# Patient Record
Sex: MALE | Race: WHITE | Employment: UNEMPLOYED | URBAN - METROPOLITAN AREA
[De-identification: names, ages, dates, MRNs, and addresses within clinical notes are randomized per-mention and may not be internally consistent; named-entity substitution may affect disease eponyms.]

---

## 2019-07-22 ENCOUNTER — HOSPITAL ENCOUNTER (OUTPATIENT)
Dept: LAB | Age: 59
Discharge: HOME | End: 2019-07-22
Payer: COMMERCIAL

## 2019-07-22 ENCOUNTER — OFFICE VISIT (OUTPATIENT)
Dept: HEMATOLOGY | Age: 59
End: 2019-07-22

## 2019-07-22 VITALS
SYSTOLIC BLOOD PRESSURE: 134 MMHG | HEART RATE: 87 BPM | OXYGEN SATURATION: 98 % | HEIGHT: 69 IN | DIASTOLIC BLOOD PRESSURE: 91 MMHG | WEIGHT: 242.25 LBS | TEMPERATURE: 97.7 F | BODY MASS INDEX: 35.88 KG/M2

## 2019-07-22 DIAGNOSIS — K70.31 ALCOHOLIC CIRRHOSIS OF LIVER WITH ASCITES (HCC): Primary | ICD-10-CM

## 2019-07-22 PROBLEM — B18.2 CHRONIC HEPATITIS C WITHOUT HEPATIC COMA (HCC): Status: ACTIVE | Noted: 2019-07-22

## 2019-07-22 PROBLEM — K74.60 CIRRHOSIS OF LIVER WITH ASCITES (HCC): Status: ACTIVE | Noted: 2019-07-22

## 2019-07-22 PROBLEM — F25.0 SCHIZOAFFECTIVE DISORDER, BIPOLAR TYPE (HCC): Status: ACTIVE | Noted: 2019-07-22

## 2019-07-22 PROBLEM — R18.8 CIRRHOSIS OF LIVER WITH ASCITES (HCC): Status: ACTIVE | Noted: 2019-07-22

## 2019-07-22 PROBLEM — F10.10 ALCOHOL ABUSE: Status: ACTIVE | Noted: 2019-07-22

## 2019-07-22 LAB
ANION GAP SERPL CALC-SCNC: 8 MMOL/L (ref 3–18)
BUN SERPL-MCNC: 10 MG/DL (ref 7–18)
BUN/CREAT SERPL: 10 (ref 12–20)
CALCIUM SERPL-MCNC: 7.9 MG/DL (ref 8.5–10.1)
CHLORIDE SERPL-SCNC: 103 MMOL/L (ref 100–111)
CO2 SERPL-SCNC: 24 MMOL/L (ref 21–32)
CREAT SERPL-MCNC: 1.05 MG/DL (ref 0.6–1.3)
FERRITIN SERPL-MCNC: 51 NG/ML (ref 8–388)
GLUCOSE SERPL-MCNC: 86 MG/DL (ref 74–99)
HBV SURFACE AB SER QL IA: NEGATIVE
HBV SURFACE AB SERPL IA-ACNC: <3.1 MIU/ML
HBV SURFACE AG SER QL: <0.1 INDEX
HBV SURFACE AG SER QL: NEGATIVE
HEP BS AB COMMENT,HBSAC: ABNORMAL
INR PPP: 1.4 (ref 0.8–1.2)
IRON SATN MFR SERPL: 45 %
IRON SERPL-MCNC: 151 UG/DL (ref 50–175)
POTASSIUM SERPL-SCNC: 3.7 MMOL/L (ref 3.5–5.5)
PROTHROMBIN TIME: 16.5 SEC (ref 11.5–15.2)
SODIUM SERPL-SCNC: 135 MMOL/L (ref 136–145)
TIBC SERPL-MCNC: 333 UG/DL (ref 250–450)

## 2019-07-22 PROCEDURE — 87340 HEPATITIS B SURFACE AG IA: CPT

## 2019-07-22 PROCEDURE — 85025 COMPLETE CBC W/AUTO DIFF WBC: CPT

## 2019-07-22 PROCEDURE — 82107 ALPHA-FETOPROTEIN L3: CPT

## 2019-07-22 PROCEDURE — 86708 HEPATITIS A ANTIBODY: CPT

## 2019-07-22 PROCEDURE — 86706 HEP B SURFACE ANTIBODY: CPT

## 2019-07-22 PROCEDURE — 85610 PROTHROMBIN TIME: CPT

## 2019-07-22 PROCEDURE — 80076 HEPATIC FUNCTION PANEL: CPT

## 2019-07-22 PROCEDURE — 80048 BASIC METABOLIC PNL TOTAL CA: CPT

## 2019-07-22 PROCEDURE — 82728 ASSAY OF FERRITIN: CPT

## 2019-07-22 PROCEDURE — 86704 HEP B CORE ANTIBODY TOTAL: CPT

## 2019-07-22 PROCEDURE — 83540 ASSAY OF IRON: CPT

## 2019-07-22 PROCEDURE — 36415 COLL VENOUS BLD VENIPUNCTURE: CPT

## 2019-07-22 RX ORDER — SPIRONOLACTONE 100 MG/1
TABLET, FILM COATED ORAL DAILY
COMMUNITY
End: 2019-08-28 | Stop reason: SDUPTHER

## 2019-07-22 RX ORDER — OXCARBAZEPINE 300 MG/1
TABLET, FILM COATED ORAL
COMMUNITY

## 2019-07-22 RX ORDER — FUROSEMIDE 20 MG/1
TABLET ORAL DAILY
COMMUNITY
End: 2019-08-28 | Stop reason: SDUPTHER

## 2019-07-22 RX ORDER — SERTRALINE HYDROCHLORIDE 100 MG/1
TABLET, FILM COATED ORAL DAILY
COMMUNITY

## 2019-07-22 RX ORDER — RISPERIDONE 3 MG/1
TABLET, FILM COATED ORAL
COMMUNITY

## 2019-07-22 NOTE — PROGRESS NOTES
[address] [facility] MD Rajeev, MD Lex Bates, [place], [facility]-BC     April S Kathie Abrazo Central CampusNP-BC   Santiago Gonzalez P-C Rosi Cogan, [facility]       Lucia Cervantes [facility] 136    at [address], [facility] Sandra Carlson  22.    [phone]    FAX: [address] [place] [address], [address] [phone]    FAX: [phone]       Patient Care Team:  Milagros Farooq NP as PCP - General (Nurse Practitioner)      Problem List  Never Reviewed          Codes Class Noted    Schizoaffective disorder, bipolar type [facility]) ICD-10-CM: F25.0  ICD-9-CM: 295.70  7/22/2019        Cirrhosis of liver with ascites [facility]) ICD-10-CM: K74.60, R18.8  ICD-9-CM: 571.5  7/22/2019        Chronic hepatitis C without hepatic coma ([facility] [address] ICD-10-CM: B18.2  ICD-9-CM: 070.54  7/22/2019        Alcohol abuse ICD-10-CM: F10.10  ICD-9-CM: 305.00  7/22/2019            The clinicians listed above have asked me to see Bertin Hardy in consultation regarding management of   cirrhosis secondary to chronic HCV and alcohol. All medical records sent by the referring physicians were reviewed including imaging studies     The patient is a 63year old  male who was told he had chronic liver disease and cirrhosis in 1990's when he lived in [place]. An assessment of liver fibrosis with biopsy or elastography has not been performed. Serologic evaluation for markers of chronic liver disease was positive for HCV, genotype 1b. Hepatitis C RNA PCR 2,050,000. The patient has developed the following complications of cirrhosis: Ascites, hepatic encephalopathy.     The patient has the following symptoms which are thought to be due to the liver disease: pain in the right side over the liver, yellowing of the eyes or skin, itching, problems concentrating, swelling of the abdomen, swelling of the lower extremities,     The patient has not experienced the following symptoms:  hematemesis, hematochezia. The patient has moderate to severe limitations in functional activities which can be attributed to the liver disease and to other medical problems that are not related to the liver disease. 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 The diagnosis of cirrhosis is based upon laboratory studies and complications of cirrhosis. Liver transaminases are elevated. ALP is elevated. Total bilirubin is elevated. INR is prolonged. Albumin is depressed. The platelet count is depressed. Based upon laboratory studies the patient appears to have cirrhosis. Will perform laboratory testing to monitor liver function and degree of liver injury. This included BMP, hepatic panel, CBC with platelet count, INR. Serologic testing for causes of chronic liver disease were ordered. We will perform laboratory studies in order to calculate the MELD score. The patient is not a candidate for liver transplantation because of poor social support. Chronic HCV   Chronic HCV with cirrhosis. Severity of the liver disease was assessed by laboratory studies  Will perform laboratory testing to monitor liver function and degree of liver injury. This included BMP, hepatic panel, CBC with platelet count, INR. Will perform and/or review results of HCV viral load,   Will perform  serologic and virologic studies to assess for other causes of chronic liver disease. Will perform imaging of the liver with ultrasound. Chronic HCV Treatment  The patient has not been treated for HCV. Discussed the treatment alternatives.   The SVR/cure rate for HCV now exceeds 97% without significant side effects for most patients with HCV. The specific treatment is dependent upon genotype, viral load and histology. The patient should be treated with Epclusa (sofosbuvir and valpitasvir) and ribavirin for 12 weeks. The SVR/cure rate for is about 90%. Screening for Esophageal varices   The patient has not had an EGD to screen for varices. Will schedule for EGD to assess for varices and need for banding. Hepatic encephalopathy   Overt HE has not developed to date. There is no need to restrict dietary protein at this time. Thrombocytopenia   This is secondary to cirrhosis. No treatment is needed at this time. Treatment of other medical problems in patients with chronic liver disease  There are no contraindications for the patient to take most medications that are necessary for treatment of other medical issues. The patient has cirrhrosis and should avoid taking NSAIDs which are associated with a higher rate of developing CORBY. The patient consumes alcohol on a daily basis or has recently stopped consuming alcohol. Regular alcohol use increases the risk of toxicity from acetaminophen. This analgesic should be avoided until the patient has been abstinent from alcohol for 6 months. Counseling for alcohol in patients with chronic liver disease  The patient has cirrhosis and was advised to be abstinent from all alcohol including non-alcoholic beer which does contain some alcohol. Drug use  The patient was counseled regarding the risk of overdose and death from using narcotic drugs and the risk of becoming reinfected with HCV once they are cured if they resume narcotic drug use. The patient is actively using narcotic drugs and was referred to a drug abuse program prior to initiating HCV treatment. Vaccinations   The need for vaccination against viral hepatitis A and B will be assessed with serologic and instituted as appropriate.   Routine vaccinations against other bacterial and viral agents can be performed as indicated. Annual flu vaccination should be administered if indicated. ALLERGIES  Allergies   Allergen Reactions    Penicillins Other (comments)     Unsure of reaction, had when he was a child       MEDICATIONS  Current Outpatient Medications   Medication Sig    sertraline (ZOLOFT) 100 mg tablet Take  by mouth daily.  risperiDONE (RISPERDAL) 3 mg tablet Take  by mouth.  OXcarbazepine (TRILEPTAL) 300 mg tablet Take  by mouth.  furosemide (LASIX) 20 mg tablet Take  by mouth daily.  spironolactone (ALDACTONE) 100 mg tablet Take  by mouth daily.  risperiDONE microspheres (RISPERDAL CONSTA) 50 mg/2 mL injection 50 mg by IntraMUSCular route once. No current facility-administered medications for this visit. SYSTEM REVIEW NOT RELATED TO LIVER DISEASE OR REVIEWED ABOVE:  Constitution systems: Negative for fever, chills, weight gain, weight loss. Eyes: Negative for visual changes. ENT: Negative for sore throat, painful swallowing. Respiratory: Negative for cough, hemoptysis, SOB. Cardiology: Negative for chest pain, palpitations. GI:  Negative for constipation or diarrhea. : Negative for urinary frequency, dysuria, hematuria, nocturia. Skin: Negative for rash. Hematology: Negative for easy bruising, blood clots. Musculo-skelatal: Negative for back pain, muscle pain, weakness. Neurologic: Negative for headaches, dizziness, vertigo, memory problems not related to HE. Psychology: Negative for anxiety, depression. FAMILY HISTORY  The father Has/had the following following chronic disease(s): Prostate cancer, HTN, DM  The patient has no knowledge of the mother's medical condition. There is no family history of liver disease. There is no family history of immune disorders. SOCIAL HISTORY:  The patient is . The patient has 2 children. The patient currently smokes 1/2 pack of tobacco daily.     The patient consumed significant amounts of alcohol since the 1970s. The patient has been abstinent from alcohol since 5/2019. He stopped using street drugs 5 years ago. He stopped using IV drugs in 2009. The patient does not work. The patient is applying for disability. PHYSICAL EXAMINATION:  Visit Vitals  BP (!) 134/91   Pulse 87   Temp 97.7 °F (36.5 °C) (Tympanic)   Ht 5' 8.5\" (1.74 m)   Wt 242 lb 4 oz (109.9 kg)   SpO2 98%   BMI 36.30 kg/m²     General: No acute distress. Eyes: Sclera anicteric. ENT: No oral lesions. Thyroid normal.  Nodes: No adenopathy. Skin: +ve spider angiomata. No jaundice. No palmar erythema. Respiratory: Lungs clear to auscultation. Cardiovascular: Regular heart rate. No murmurs. No JVD. Abdomen: Soft non-tender. Liver size normal to percussion/palpation. Spleen not palpable. No obvious ascites. Extremities: No edema. No muscle wasting. No gross arthritic changes. Neurologic: Alert and oriented. Cranial nerves grossly intact. No asterixis. LABORATORY STUDIES:  Liver [place] of [address] [place] [address] & Units 8/19/2019 7/22/2019   WBC 4.6 - 13.2 K/uL 2.9 (L) 2.5 (L)   ANC 1.8 - 8.0 K/UL 1.6 (L) 1.2 (L)   HGB 13.0 - 16.0 g/dL 12.6 (L) 11.4 (L)    - 420 K/uL 55 (L) 52 (L)   INR 0.8 - 1.2   1.3 (H) 1.4 (H)   AST 10 - 38 U/L 83 (H) 87 (H)   ALT 16 - 61 U/L 50 45   Alk Phos 45 - 117 U/L 209 (H) 200 (H)   Bili, Total 0.2 - 1.0 MG/DL 1.6 (H) 1.6 (H)   Bili, Direct 0.0 - 0.2 MG/DL 1.0 (H) 1.0 (H)   Albumin 3.4 - 5.0 g/dL 2.6 (L) 2.4 (L)   BUN 7.0 - 18 MG/DL 14 10   Creat 0.6 - 1.3 MG/DL 1.08 1.05   Na 136 - 145 mmol/L 138 135 (L)   K 3.5 - 5.5 mmol/L 3.5 3.7   Cl 100 - 111 mmol/L 106 103   CO2 21 - 32 mmol/L 25 24   Glucose 74 - 99 mg/dL 92 86   Ammonia 11 - 32 UMOL/L 69 (H)      Cancer Screening Latest Ref Rng & Units 8/19/2019 7/22/2019   AFP, Serum 0.0 - 8.0 ng/mL 41.5 (H) 28.1 (H)   AFP-L3% 0.0 - 9.9 % 14.1 (H) 15.1 (H)     SEROLOGIES:  1/2019.   anti-HCV positive, Genotype 1b  2/2019. HCV RNA 2.1 million intU    Serologies Latest Ref Rng & Units 7/22/2019   Hep A Ab, Total NEGATIVE   NEGATIVE   Hep B Surface Ag <1.00 Index <0.10   Hep B Surface Ag Interp NEG   NEGATIVE   Hep B Core Ab, Total NEGATIVE   NEGATIVE   Hep B Surface Ab >10.0 mIU/mL <3.10 (L)   Hep B Surface Ab Interp POS   NEGATIVE (A)   Ferritin 8 - 388 NG/ML 51   Iron % Saturation % 45     LIVER HISTOLOGY:  8/2019. Sheer wave elastography performed at THE [facility]. E Range: 7.74-12.42 kPa, E Mean: 8.63 kPa, E Median: 7.91 kPa, E Std: 2.0 kPa. The results suggested a fibrosis level of F2.    ENDOSCOPIC PROCEDURES:  Not available or performed    RADIOLOGY:  8/2019. Ultrasound of liver. Echogenic consistent with cirrhosis. No liver mass lesions. No dilated bile ducts. No ascites. OTHER TESTING:  Not available or performed    FOLLOW-UP:  All of the issues listed above in the Assessment and Plan were discussed with the patient. All questions were answered. The patient expressed a clear understanding of the above. [address] [address] in  4 weeks for elastography and to review all data and determine the treatment plan. Myah Florence MD  [place] [address].  [phone]  Cierra Kc MD  I have personally interviewed and examined the patient during the encounter. I have explained the key findings related to the liver disease and other pertinent diagnoses as noted above to the patient and answered all questions. I have reviewed and agree with the findings documented above, including all diagnostic interpretations, and plans. I have edited the original note as appropriate for clarity.     Ofelia Christiansen MD  [address]  4 [facility], [address] [place] [address], 300 [facility] [address]  12 [facility]

## 2019-07-22 NOTE — Clinical Note
8/25/19 Patient: Merlyn Qureshi YOB: 1960 Date of Visit: 7/22/2019 Spencer Mederos NP 
[address] [facility] 40 [facility] [address] VIA Facsimile: [phone] Dear Spencer Mederos NP, Thank you for referring Mr. Merlyn Qureshi to [facility] for evaluation. My notes for this consultation are attached. If you have questions, please do not hesitate to call me. I look forward to following your patient along with you. Sincerely, Zayra Turk MD

## 2019-08-13 ENCOUNTER — HOSPITAL ENCOUNTER (OUTPATIENT)
Dept: ULTRASOUND IMAGING | Age: 59
Discharge: HOME | End: 2019-08-13
Payer: COMMERCIAL

## 2019-08-13 DIAGNOSIS — K70.31 ALCOHOLIC CIRRHOSIS OF LIVER WITH ASCITES (HCC): ICD-10-CM

## 2019-08-13 PROCEDURE — 76981 USE PARENCHYMA: CPT

## 2019-08-18 LAB
AFP L3 MFR SERPL: 15.1 % (ref 0–9.9)
AFP SERPL-MCNC: 28.1 NG/ML (ref 0–8)
ALBUMIN SERPL-MCNC: 2.4 G/DL (ref 3.4–5)
ALBUMIN/GLOB SERPL: 0.5 {RATIO} (ref 0.8–1.7)
ALP SERPL-CCNC: 200 U/L (ref 45–117)
ALT SERPL-CCNC: 45 U/L (ref 16–61)
AST SERPL-CCNC: 87 U/L (ref 10–38)
BASOPHILS # BLD: 0 K/UL (ref 0–0.1)
BASOPHILS NFR BLD: 0 % (ref 0–2)
BILIRUB DIRECT SERPL-MCNC: 1 MG/DL (ref 0–0.2)
BILIRUB SERPL-MCNC: 1.6 MG/DL (ref 0.2–1)
DIFFERENTIAL METHOD BLD: ABNORMAL
EOSINOPHIL # BLD: 0 K/UL (ref 0–0.4)
EOSINOPHIL NFR BLD: 2 % (ref 0–5)
ERYTHROCYTE [DISTWIDTH] IN BLOOD BY AUTOMATED COUNT: 15.8 % (ref 11.6–14.5)
GLOBULIN SER CALC-MCNC: 4.8 G/DL (ref 2–4)
HAV AB SER QL IA: NEGATIVE
HBV CORE AB SERPL QL IA: NEGATIVE
HCT VFR BLD AUTO: 32.4 % (ref 36–48)
HGB BLD-MCNC: 11.4 G/DL (ref 13–16)
LYMPHOCYTES # BLD: 0.9 K/UL (ref 0.9–3.6)
LYMPHOCYTES NFR BLD: 34 % (ref 21–52)
MCH RBC QN AUTO: 35.1 PG (ref 24–34)
MCHC RBC AUTO-ENTMCNC: 35.2 G/DL (ref 31–37)
MCV RBC AUTO: 99.7 FL (ref 74–97)
MONOCYTES # BLD: 0.4 K/UL (ref 0.05–1.2)
MONOCYTES NFR BLD: 15 % (ref 3–10)
NEUTS SEG # BLD: 1.2 K/UL (ref 1.8–8)
NEUTS SEG NFR BLD: 49 % (ref 40–73)
PLATELET # BLD AUTO: 52 K/UL (ref 135–420)
PMV BLD AUTO: 9.8 FL (ref 9.2–11.8)
PROT SERPL-MCNC: 7.2 G/DL (ref 6.4–8.2)
RBC # BLD AUTO: 3.25 M/UL (ref 4.7–5.5)
WBC # BLD AUTO: 2.5 K/UL (ref 4.6–13.2)

## 2019-08-19 ENCOUNTER — OFFICE VISIT (OUTPATIENT)
Dept: HEMATOLOGY | Age: 59
End: 2019-08-19

## 2019-08-19 ENCOUNTER — HOSPITAL ENCOUNTER (OUTPATIENT)
Dept: LAB | Age: 59
Discharge: HOME | End: 2019-08-19
Payer: COMMERCIAL

## 2019-08-19 VITALS
RESPIRATION RATE: 16 BRPM | OXYGEN SATURATION: 94 % | SYSTOLIC BLOOD PRESSURE: 114 MMHG | BODY MASS INDEX: 35.55 KG/M2 | WEIGHT: 240 LBS | HEIGHT: 69 IN | DIASTOLIC BLOOD PRESSURE: 86 MMHG | TEMPERATURE: 97.9 F | HEART RATE: 88 BPM

## 2019-08-19 DIAGNOSIS — K70.31 ALCOHOLIC CIRRHOSIS OF LIVER WITH ASCITES (HCC): Primary | ICD-10-CM

## 2019-08-19 DIAGNOSIS — K70.31 ALCOHOLIC CIRRHOSIS OF LIVER WITH ASCITES (HCC): ICD-10-CM

## 2019-08-19 DIAGNOSIS — E66.01 SEVERE OBESITY (HCC): ICD-10-CM

## 2019-08-19 LAB
ALBUMIN SERPL-MCNC: 2.6 G/DL (ref 3.4–5)
ALBUMIN/GLOB SERPL: 0.5 {RATIO} (ref 0.8–1.7)
ALP SERPL-CCNC: 209 U/L (ref 45–117)
ALT SERPL-CCNC: 50 U/L (ref 16–61)
AMMONIA PLAS-SCNC: 69 UMOL/L (ref 11–32)
ANION GAP SERPL CALC-SCNC: 7 MMOL/L (ref 3–18)
AST SERPL-CCNC: 83 U/L (ref 10–38)
BASOPHILS # BLD: 0 K/UL (ref 0–0.1)
BASOPHILS NFR BLD: 1 % (ref 0–2)
BILIRUB DIRECT SERPL-MCNC: 1 MG/DL (ref 0–0.2)
BILIRUB SERPL-MCNC: 1.6 MG/DL (ref 0.2–1)
BUN SERPL-MCNC: 14 MG/DL (ref 7–18)
BUN/CREAT SERPL: 13 (ref 12–20)
CALCIUM SERPL-MCNC: 8.1 MG/DL (ref 8.5–10.1)
CHLORIDE SERPL-SCNC: 106 MMOL/L (ref 100–111)
CO2 SERPL-SCNC: 25 MMOL/L (ref 21–32)
CREAT SERPL-MCNC: 1.08 MG/DL (ref 0.6–1.3)
DIFFERENTIAL METHOD BLD: ABNORMAL
EOSINOPHIL # BLD: 0.1 K/UL (ref 0–0.4)
EOSINOPHIL NFR BLD: 2 % (ref 0–5)
ERYTHROCYTE [DISTWIDTH] IN BLOOD BY AUTOMATED COUNT: 15.7 % (ref 11.6–14.5)
ETHANOL SERPL-MCNC: <3 MG/DL (ref 0–3)
GLOBULIN SER CALC-MCNC: 5 G/DL (ref 2–4)
GLUCOSE SERPL-MCNC: 92 MG/DL (ref 74–99)
HCT VFR BLD AUTO: 35.4 % (ref 36–48)
HGB BLD-MCNC: 12.6 G/DL (ref 13–16)
INR PPP: 1.3 (ref 0.8–1.2)
LYMPHOCYTES # BLD: 0.9 K/UL (ref 0.9–3.6)
LYMPHOCYTES NFR BLD: 32 % (ref 21–52)
MCH RBC QN AUTO: 36.3 PG (ref 24–34)
MCHC RBC AUTO-ENTMCNC: 35.6 G/DL (ref 31–37)
MCV RBC AUTO: 102 FL (ref 74–97)
MONOCYTES # BLD: 0.3 K/UL (ref 0.05–1.2)
MONOCYTES NFR BLD: 11 % (ref 3–10)
NEUTS SEG # BLD: 1.6 K/UL (ref 1.8–8)
NEUTS SEG NFR BLD: 54 % (ref 40–73)
PLATELET # BLD AUTO: 55 K/UL (ref 135–420)
PMV BLD AUTO: 10.3 FL (ref 9.2–11.8)
POTASSIUM SERPL-SCNC: 3.5 MMOL/L (ref 3.5–5.5)
PROT SERPL-MCNC: 7.6 G/DL (ref 6.4–8.2)
PROTHROMBIN TIME: 15.9 SEC (ref 11.5–15.2)
RBC # BLD AUTO: 3.47 M/UL (ref 4.7–5.5)
SODIUM SERPL-SCNC: 138 MMOL/L (ref 136–145)
WBC # BLD AUTO: 2.9 K/UL (ref 4.6–13.2)

## 2019-08-19 PROCEDURE — 85610 PROTHROMBIN TIME: CPT

## 2019-08-19 PROCEDURE — 85025 COMPLETE CBC W/AUTO DIFF WBC: CPT

## 2019-08-19 PROCEDURE — 36415 COLL VENOUS BLD VENIPUNCTURE: CPT

## 2019-08-19 PROCEDURE — 80048 BASIC METABOLIC PNL TOTAL CA: CPT

## 2019-08-19 PROCEDURE — 80076 HEPATIC FUNCTION PANEL: CPT

## 2019-08-19 PROCEDURE — 80307 DRUG TEST PRSMV CHEM ANLYZR: CPT

## 2019-08-19 PROCEDURE — 82140 ASSAY OF AMMONIA: CPT

## 2019-08-19 PROCEDURE — 82107 ALPHA-FETOPROTEIN L3: CPT

## 2019-08-19 NOTE — PROGRESS NOTES
[facility], MD, MD Moreno Elliott, [place], [facility]-BC     Tamie Vázquez, [facility]   JUAN A BirminghamP-AVA [facility]       [facility] 136    at [address], [address] [place] [address], [facility] 22.    [phone]    FAX: [address] [place] [address], [address]    [phone]    FAX: [phone]       Patient Care Team:  Oma Kawasaki, NP as PCP - General (Nurse Practitioner)      Problem List  Date Reviewed: 8/19/2019          Codes Class Noted    Severe obesity ([facility].) ICD-10-CM: E66.01  ICD-9-CM: 278.01  8/19/2019        Schizoaffective disorder, bipolar type ([facility].) ICD-10-CM: F25.0  ICD-9-CM: 295.70  7/22/2019        Cirrhosis of liver with ascites [facility]) ICD-10-CM: K74.60, R18.8  ICD-9-CM: 571.5  7/22/2019        Chronic hepatitis C without hepatic coma ([facility].) ICD-10-CM: B18.2  ICD-9-CM: 070.54  7/22/2019        Alcohol abuse ICD-10-CM: F10.10  ICD-9-CM: 305.00  7/22/2019              Jimmy Ramires returns to the [place] [address] today for education and management of cirrhosis secondary to HCV and alcohol. The active problem list, all pertinent past medical history, medications, liver histology, endoscopic studies, radiologic findings and laboratory findings related to the liver disorder were reviewed with the patient. The patient is a 63year old  male who was found to have chronic liver disease and cirrhosis in 1990's. The patient was found to have cirrhosis when he used to live in [facility] [place]. An assessment of liver fibrosis with shear wave elastography was performed in 08/2019 and suggests a Metavir fibrosis score of F1.   However the wide standard deviation and broad range brings the mean and medium into question. The patient clearly has cirrhosis. Serologic evaluation for markers of chronic liver disease was positive for HCV, genotype 1b. Hepatitis C RNA PCR 2,050,000. The patient has developed the following complications of cirrhosis: Ascites, hepatic encephalopathy. The patient has the following symptoms which are thought to be due to the liver disease: pain in the right side over the liver, yellowing of the eyes or skin, itching, problems concentrating, swelling of the abdomen, swelling of the lower extremities,     The patient has not experienced the following symptoms:  hematemesis, hematochezia. The patient has moderate to severe limitations in functional activities which can be attributed to the liver disease. and to other medical problems that are not related to the liver disease. Since the last office appointment the patient has:  Had no changes in the liver disease. 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 The diagnosis of cirrhosis is based upon laboratory studies, imaging, and complications of cirrhosis. The most recent laboratory studies indicate that the liver transaminases are elevated, alkaline phosphatase is elevated, tests of hepatic synthetic and metabolic function are depressed, and the platelet count is depressed. Will perform laboratory testing to monitor liver function and degree of liver injury. This will include hepatic panel, a CBC w/ diff, a BMP, a PT/INR, an ammonia level, and alcohol screen, and an AFP-L3%. Based upon laboratory studies the patient clearly has cirrhosis. We will perform laboratory studies in order to calculate the [facility] MELD score. CPT score is 8, Child's Yee Class is B, MELD score is 12. The patient is not a candidate for liver transplantation because of poor social support. Chronic HCV   Chronic HCV with cirrhosis. Severity of the liver disease was assessed by laboratory studies  Will perform laboratory testing to monitor liver function and degree of liver injury. This included BMP, hepatic panel, CBC with platelet count, INR. Serologic evaluation was negative for all causes of chronic liver disease. Chronic HCV Treatment  The patient has genotype 1B and is treatment naive. Discussed the treatment alternatives. The SVR/cure rate for HCV now exceeds 97% without significant side effects for most patients with HCV. The specific treatment is dependent upon genotype, viral load and histology. The patient should be treated for 12 weeks. Unfortunately, all current HCV treatment modalities are contraindicated with Trilpetal.   Neither the patient or his caregiver know why he is on the Trilpetal.  He denies ever having any seizure activity. I will reach out to Dr. Chase Dickson and ask if he can change the medication to something that is okay to use with the current antiviral.  [address] is one anti-seizure medication that is not contraindicated with current antivirals. Screening for Esophageal varices   The patient has not had an EGD to screen for varices. The patient is scheduled for EGD. Hepatic encephalopathy   Overt HE has not developed to date. There is no need to restrict dietary protein at this time. Thrombocytopenia   This is secondary to cirrhosis. No treatment is needed at this time. Treatment of other medical problems in patients with chronic liver disease  There are no contraindications for the patient to take most medications that are necessary for treatment of other medical issues. The patient has cirrhrosis and should avoid taking Benzodiazapines which could exacerbate HE.  NSAIDs which are associated with a higher rate of developing CORBY.       The patient can take any medications utilized for treatment of DM or statins to treat hypercholesterolemia  The patient has recently stopped consuming alcohol. Regular alcohol use increases the risk of toxicity from acetaminophen. This analgesic should be avoided until the patient has been abstinent from alcohol for 6 months. Counseling for alcohol in patients with chronic liver disease  The patient was counseled regarding alcohol consumption and the effect of alcohol on chronic liver disease. The patient has cirrhosis and was advised to be abstinent from all alcohol including non-alcoholic beer which does contain some alcohol. Osteoporosis  The risk of osteoporosis is increased in patients with cirrhosis. DEXA bone density to assess for osteoporosis      This should be ordered by the patients primary care physician. Vaccinations   Vaccination for viral hepatitis A and B is recommended. The patient does not have serologic evidence of prior exposure or vaccination with immunity. Routine vaccinations against other bacterial and viral agents can be performed as indicated. Annual flu vaccination should be administered if indicated. ALLERGIES  Allergies   Allergen Reactions    Penicillins Other (comments)     Unsure of reaction, had when he was a child       MEDICATIONS  Current Outpatient Medications   Medication Sig    sertraline (ZOLOFT) 100 mg tablet Take  by mouth daily.  risperiDONE (RISPERDAL) 3 mg tablet Take  by mouth.  OXcarbazepine (TRILEPTAL) 300 mg tablet Take  by mouth.  furosemide (LASIX) 20 mg tablet Take  by mouth daily.  spironolactone (ALDACTONE) 100 mg tablet Take  by mouth daily.  risperiDONE microspheres (RISPERDAL CONSTA) 50 mg/2 mL injection 50 mg by IntraMUSCular route once. No current facility-administered medications for this visit. SYSTEM REVIEW NOT RELATED TO LIVER DISEASE OR REVIEWED ABOVE:  Constitution systems: Negative for fever, chills, weight gain, weight loss. Eyes: Negative for visual changes. ENT: Negative for sore throat, painful swallowing.    Respiratory: Negative for cough, hemoptysis, SOB. Cardiology: Negative for chest pain, palpitations. GI:  Negative for constipation or diarrhea. : Negative for urinary frequency, dysuria, hematuria, nocturia. Skin: Negative for rash. Hematology: Negative for easy bruising, blood clots. Musculo-skelatal: Negative for back pain, muscle pain, weakness. Neurologic: Negative for headaches, dizziness, vertigo, memory problems not related to HE. Psychology: Negative for anxiety, depression. FAMILY HISTORY  The father Has/had the following following chronic disease(s): Prostate cancer, HTN, DM  The patient has no knowledge of the mother's medical condition. There is no family history of liver disease. There is no family history of immune disorders. SOCIAL HISTORY:  The patient is . The patient has 2 children. The patient currently smokes 1/2 pack of tobacco daily. The patient used to consume significant amount of alcohol- liquor, beer for over 46 years. The patient has been abstinent from alcohol since May 2019. Stopped using street drugs 5 years ago. Stopped IV drug use 10 years ago. The patient does not work. The patient is applying for disability. PHYSICAL EXAMINATION:  Visit Vitals  /86 (BP 1 Location: Right arm, BP Patient Position: Sitting)   Pulse 88   Temp 97.9 °F (36.6 °C) (Tympanic)   Resp 16   Ht 5' 8.5\" (1.74 m)   Wt 240 lb (108.9 kg)   SpO2 94%   BMI 35.96 kg/m²     General: No acute distress. Eyes: Sclera anicteric. ENT: No oral lesions. Thyroid normal.  Nodes: No adenopathy. Skin: +ve spider angiomata. No jaundice. No palmar erythema. Respiratory: Lungs clear to auscultation. Cardiovascular: Regular heart rate. No murmurs. No JVD. Abdomen: Soft non-tender. Liver size normal to percussion/palpation. Spleen not palpable. No obvious ascites. Extremities: No edema. No muscle wasting. No gross arthritic changes.   Neurologic: Alert and oriented. Cranial nerves grossly intact. No asterixis. LABORATORY STUDIES:  Liver [place] of [id number] & Units 8/19/2019 7/22/2019   WBC 4.6 - 13.2 K/uL 2.9 (L) 2.5 (L)   ANC 1.8 - 8.0 K/UL 1.6 (L) 1.2 (L)   HGB 13.0 - 16.0 g/dL 12.6 (L) 11.4 (L)    - 420 K/uL 55 (L) 52 (L)   INR 0.8 - 1.2   1.3 (H) 1.4 (H)   AST 10 - 38 U/L 83 (H) 87 (H)   ALT 16 - 61 U/L 50 45   Alk Phos 45 - 117 U/L 209 (H) 200 (H)   Bili, Total 0.2 - 1.0 MG/DL 1.6 (H) 1.6 (H)   Bili, Direct 0.0 - 0.2 MG/DL 1.0 (H) 1.0 (H)   Albumin 3.4 - 5.0 g/dL 2.6 (L) 2.4 (L)   BUN 7.0 - 18 MG/DL 14 10   Creat 0.6 - 1.3 MG/DL 1.08 1.05   Na 136 - 145 mmol/L 138 135 (L)   K 3.5 - 5.5 mmol/L 3.5 3.7   Cl 100 - 111 mmol/L 106 103   CO2 21 - 32 mmol/L 25 24   Glucose 74 - 99 mg/dL 92 86   Ammonia 11 - 32 UMOL/L 69 (H)      From 1/2019  AST/ALT/ALP/T Bili/ALB:217/82/129/2.6/2.9  WBC/HB/PLT/INR:2.4/12.4/55/1.48  BUN/CREAT:11/0.97    SEROLOGIES:  Serologies Latest Ref Rng & Units 7/22/2019   Hep A Ab, Total NEGATIVE   NEGATIVE   Hep B Surface Ag <1.00 Index <0.10   Hep B Surface Ag Interp NEG   NEGATIVE   Hep B Core Ab, Total NEGATIVE   NEGATIVE   Hep B Surface Ab >10.0 mIU/mL <3.10 (L)   Hep B Surface Ab Interp POS   NEGATIVE (A)   Ferritin 8 - 388 NG/ML 51   Iron % Saturation % 45     1/2019  anti-HCV positive,   Genotype 1b    LIVER HISTOLOGY:  08/2019. TRANSIENT HEPATIC ELASTOGRAPHY:   E Range: 7.74-12.42 kPa  E Mean: 8.63 kPa  E Median: 7.91 kPa  E Std: 2.0 kPa    ENDOSCOPIC PROCEDURES:  Not available or performed    RADIOLOGY:  08/2019. Ultrasound of the liver. Coarsened echotexture with lobular surface contour demonstrated. No focal mass. OTHER TESTING:  Not available or performed    FOLLOW-UP:  All of the issues listed above in the Assessment and Plan were discussed with the patient. All questions were answered. The patient expressed a clear understanding of the above.     [address] in 12 weeks to review all data and determine the treatment plan.     ZEB Bhatti-AVA  Liver [place] [address], [address] [place] [address], [address]   [phone]

## 2019-08-19 NOTE — PROGRESS NOTES
Aly Dickson is a 63 y.o. male      1. Have you been to the ER, urgent care clinic or hospitalized since your last visit? NO.     2. Have you seen or consulted any other health care providers outside of the [address] [place] [address] since your last visit (Include any pap smears or colon screening)?  NO          Learning Assessment 8/19/2019   PRIMARY LEARNER Patient   BARRIERS PRIMARY LEARNER HEARING   CO-LEARNER CAREGIVER No   PRIMARY LANGUAGE ENGLISH   LEARNER PREFERENCE PRIMARY LISTENING   ANSWERED BY PATIENT   RELATIONSHIP SELF

## 2019-08-21 LAB
AFP L3 MFR SERPL: 14.1 % (ref 0–9.9)
AFP SERPL-MCNC: 41.5 NG/ML (ref 0–8)

## 2019-08-29 RX ORDER — SPIRONOLACTONE 100 MG/1
100 TABLET, FILM COATED ORAL DAILY
Qty: 90 TAB | Refills: 3 | Status: SHIPPED | OUTPATIENT
Start: 2019-08-29

## 2019-08-29 RX ORDER — FUROSEMIDE 20 MG/1
20 TABLET ORAL DAILY
Qty: 90 TAB | Refills: 3 | Status: SHIPPED | OUTPATIENT
Start: 2019-08-29

## 2019-09-04 RX ORDER — LACTULOSE 10 G/15ML
20 SOLUTION ORAL; RECTAL 2 TIMES DAILY
Qty: 946 ML | Refills: 11 | Status: SHIPPED | OUTPATIENT
Start: 2019-09-04

## 2019-09-11 ENCOUNTER — HOSPITAL ENCOUNTER (OUTPATIENT)
Age: 59
Setting detail: OUTPATIENT SURGERY
Discharge: HOME | End: 2019-09-11
Attending: INTERNAL MEDICINE | Admitting: INTERNAL MEDICINE
Payer: COMMERCIAL

## 2019-09-11 VITALS
WEIGHT: 242.7 LBS | RESPIRATION RATE: 16 BRPM | OXYGEN SATURATION: 94 % | HEART RATE: 80 BPM | HEIGHT: 69 IN | BODY MASS INDEX: 35.95 KG/M2 | TEMPERATURE: 96.8 F | DIASTOLIC BLOOD PRESSURE: 79 MMHG | SYSTOLIC BLOOD PRESSURE: 116 MMHG

## 2019-09-11 PROCEDURE — 74011250636 HC RX REV CODE- 250/636

## 2019-09-11 PROCEDURE — 74011250636 HC RX REV CODE- 250/636: Performed by: INTERNAL MEDICINE

## 2019-09-11 PROCEDURE — 76040000019: Performed by: INTERNAL MEDICINE

## 2019-09-11 RX ORDER — SODIUM CHLORIDE 0.9 % (FLUSH) 0.9 %
5-40 SYRINGE (ML) INJECTION EVERY 8 HOURS
Status: CANCELLED | OUTPATIENT
Start: 2019-09-11

## 2019-09-11 RX ORDER — FLUMAZENIL 0.1 MG/ML
0.2 INJECTION INTRAVENOUS
Status: DISCONTINUED | OUTPATIENT
Start: 2019-09-11 | End: 2019-09-11 | Stop reason: HOSPADM

## 2019-09-11 RX ORDER — SODIUM CHLORIDE 9 MG/ML
150 INJECTION, SOLUTION INTRAVENOUS CONTINUOUS
Status: DISCONTINUED | OUTPATIENT
Start: 2019-09-11 | End: 2019-09-11 | Stop reason: HOSPADM

## 2019-09-11 RX ORDER — ATROPINE SULFATE 0.1 MG/ML
0.5 INJECTION INTRAVENOUS
Status: CANCELLED | OUTPATIENT
Start: 2019-09-11 | End: 2019-09-12

## 2019-09-11 RX ORDER — NALOXONE HYDROCHLORIDE 0.4 MG/ML
0.4 INJECTION, SOLUTION INTRAMUSCULAR; INTRAVENOUS; SUBCUTANEOUS
Status: DISCONTINUED | OUTPATIENT
Start: 2019-09-11 | End: 2019-09-11 | Stop reason: HOSPADM

## 2019-09-11 RX ORDER — EPINEPHRINE 0.1 MG/ML
1 INJECTION INTRACARDIAC; INTRAVENOUS
Status: CANCELLED | OUTPATIENT
Start: 2019-09-11 | End: 2019-09-12

## 2019-09-11 RX ORDER — DEXTROMETHORPHAN/PSEUDOEPHED 2.5-7.5/.8
1.2 DROPS ORAL
Status: CANCELLED | OUTPATIENT
Start: 2019-09-11

## 2019-09-11 RX ORDER — SODIUM CHLORIDE 0.9 % (FLUSH) 0.9 %
5-40 SYRINGE (ML) INJECTION AS NEEDED
Status: CANCELLED | OUTPATIENT
Start: 2019-09-11

## 2019-09-11 RX ORDER — FENTANYL CITRATE 50 UG/ML
100 INJECTION, SOLUTION INTRAMUSCULAR; INTRAVENOUS
Status: DISCONTINUED | OUTPATIENT
Start: 2019-09-11 | End: 2019-09-11 | Stop reason: HOSPADM

## 2019-09-11 RX ORDER — SODIUM CHLORIDE 9 MG/ML
100 INJECTION, SOLUTION INTRAVENOUS CONTINUOUS
Status: CANCELLED | OUTPATIENT
Start: 2019-09-11 | End: 2019-09-11

## 2019-09-11 RX ORDER — MIDAZOLAM HYDROCHLORIDE 1 MG/ML
.25-5 INJECTION, SOLUTION INTRAMUSCULAR; INTRAVENOUS
Status: DISCONTINUED | OUTPATIENT
Start: 2019-09-11 | End: 2019-09-11 | Stop reason: HOSPADM

## 2019-09-11 RX ADMIN — SODIUM CHLORIDE 150 ML/HR: 900 INJECTION, SOLUTION INTRAVENOUS at 07:42

## 2019-09-11 NOTE — PROCEDURES
[facility]40 [facility] [place], Cite Mongi Slim, Red Jakob, MD Anson Batch, [place] Guerrero, [facility]     Tamie Vázquez, [facility]   SCOTT [place], [facility]       Lucia Cervantes [facility] 136    at [address], [facility] Sandra Carlson  22.    [phone]    FAX: [address] [place] [address], [address] [place] [address] - [address]    [phone]    FAX: [phone]         UPPER ENDOSCOPY PROCEDURE NOTE    Maykelsi Tanikarowdy  1960    INDICATION: Cirrhosis. Screening for esophageal varices with variceal ligation if  indicated. : Mahsa Gilliland MD    ANESTHESIA/SEDATION: Versed 5 mg and Fentanyl 100 mcg      PROCEDURE DESCRIPTION:  [employer] consent was obtained from the patient for the procedure. All risks and benefits of the procedure explained. The patient was taken to the endoscopy suite and placed in the left lateral decubitus position. Following sequential administration of sedation to doses as indicated above the endoscope was inserted into the mouth and advanced under direct vision to the second portion of the duodenum. Careful inspection of upper gastrointestinal tract was made as the endoscope was inserted and withdrawn. Retroflexion of the endoscope to view of the cardia of the stomach was performed. The findings and interventions are described below. FINDINGS:  Esophagus:    Normal.      Stomach: Moderate portal hypertensive gastropathy of the body of the stomach  No gastric varices identified. Duodenum:   Normal bulb and second portion    INTERVENTION:   None    COMPLICATIONS: None. The patient tolerated the procedure well. EBL: Negligible.            RECOMMENDATIONS:  Observe until discharge parameters are achieved. May resume previous diet. Repeat endoscopy to reassess varices and need for banding in 3 years. Follow-up [facility] office as scheduled.       Kirsten Barnes MD  [address]  4 [facility], [address] [place] [address] Xiomy Gage, [address]  12 [facility]

## 2019-09-11 NOTE — DISCHARGE INSTRUCTIONS
3340 [facility], MD, [address], Cite Tsering Alvarado, MD Lucretia Nagy PA-C Evonnie Bowie, [facility]     Tamie RYAN Kathie, [facility]   Madan ReesSCOTT, [facility]       Luciamatthias Cervantes Sures[facility] De Nixon 136    at [address], [address] Sandra Carlson  22.    [phone]    FAX: [address] [place] [address], [address]    [phone]    FAX: Horacio Scott 476  1960  Date: 9/11/2019    DISCOMFORT:  Use lozenges or warm salt water gargle for sore thoat  Apply warm compress to IV site if red. If redness or soreness persists call the office. You may experience gas and bloating. Walking and belching will help relieve this. You may experience chest discomfort or pressure especially if banding of esophageal varices was performed. DIET:  You may resume your normal diet. ACTIVITY:  Spend the remainder of the day resting. Avoid any strenuous activity. You may not operate a vehicle for 12 hours. You may not engage in an occupation involving machinery or appliances for rest of today. Avoid making any critical or financial decisions for at least 24 hour. [facility] the [phone] if you have any of the following:  Increasing chest or abdominal pain, nausea, vomiting, vomiting blood, abdominal distension or swelling, fever or chills, bloody discharge from nose or mouth or shortness of breath. Follow-up Instructions:  Call Dr. Paddy Caro for any questions or problems at the phone number listed above. If a biopsy was performed, you will be contacted by the office staff or Dr Paddy Caro within 1 week.    If you have not heard from us by then you may call the office at the phone number listed above to inquire about the results. ENDOSCOPY FINDINGS:  Your endoscopy demonstrated mild swelling of the stoamch. DISCHARGE SUMMARY from the Nurse: The following personal items collected during your admission are returned to you:   Dental Appliance: Dental Appliances: None  Vision: Visual Aid: Glasses(reading)  Hearing Aid:    Jewelry:    Clothing:    Other Valuables:    Valuables sent to safe:          DISCHARGE SUMMARY from Nurse    The following personal items collected during your admission are returned to you:   Dental Appliance: Dental Appliances: None  Vision: Visual Aid: Glasses(reading)  Hearing Aid:    Jewelry:    Clothing:    Other Valuables:    Valuables sent to safe:      As abpve        PATIENT INSTRUCTIONS:    After general anesthesia or intravenous sedation, for 24 hours or while taking prescription Narcotics:  · Limit your activities  · Do not drive and operate hazardous machinery  · Do not make important personal or business decisions  · Do  not drink alcoholic beverages  · If you have not urinated within 8 hours after discharge, please contact your surgeon on call. Report the following to your surgeon:  · Excessive pain, swelling, redness or odor of or around the surgical area  · Temperature over 100.5  · Nausea and vomiting lasting longer than 4 hours or if unable to take medications  · Any signs of decreased circulation or nerve impairment to extremity: change in color, persistent  numbness, tingling, coldness or increase pain  · Any questions      Follow-up Appointments   Procedures    FOLLOW UP VISIT Appointment in: Other (Specify) AS scheduled     AS scheduled     Standing Status:   Standing     Number of Occurrences:   1     Order Specific Question:   Appointment in     Answer:    Other (Specify)       What to do at Home:  Recommended activity: as above,     If you experience any of the following symptoms as above, please follow up with Dr. Yamil León. *  Please give a list of your current medications to your Primary Care Provider. *  Please update this list whenever your medications are discontinued, doses are      changed, or new medications (including over-the-counter products) are added. *  Please carry medication information at all times in case of emergency situations. These are general instructions for a healthy lifestyle:    No smoking/ No tobacco products/ Avoid exposure to second hand smoke    Surgeon General's Warning:  Quitting smoking now greatly reduces serious risk to your health. Obesity, smoking, and sedentary lifestyle greatly increases your risk for illness    A healthy diet, regular physical exercise & weight monitoring are important for maintaining a healthy lifestyle    You may be retaining fluid if you have a history of heart failure or if you experience any of the following symptoms:  Weight gain of 3 pounds or more overnight or 5 pounds in a week, increased swelling in our hands or feet or shortness of breath while lying flat in bed. Please call your doctor as soon as you notice any of these symptoms; do not wait until your next office visit. Recognize signs and symptoms of STROKE:    F-face looks uneven    A-arms unable to move or move unevenly    S-speech slurred or non-existent    T-time-call 911 as soon as signs and symptoms begin-DO NOT go       Back to bed or wait to see if you get better-TIME IS BRAIN. The discharge information has been reviewed with the patient and caregiver. The patient and caregiver verbalized understanding. Warning Signs of HEART ATTACK     Call 911 if you have these symptoms:   Chest discomfort. Most heart attacks involve discomfort in the center of the chest that lasts more than a few minutes, or that goes away and comes back. It can feel like uncomfortable pressure, squeezing, fullness, or pain.  Discomfort in other areas of the upper body.  Symptoms can include pain or discomfort in one or both arms, the back, neck, jaw, or stomach.  Shortness of breath with or without chest discomfort.  Other signs may include breaking out in a cold sweat, nausea, or lightheadedness. Don't wait more than five minutes to call 911 - MINUTES MATTER! Fast action can save your life. Calling 911 is almost always the fastest way to get lifesaving treatment. Emergency Medical Services staff can begin treatment when they arrive -- up to an hour sooner than if someone gets to the hospital by car. The discharge information has been reviewed with the patient and caregiver. The patient and caregiver verbalized understanding. Discharge medications reviewed with the patient and guardian and appropriate educational materials and side effects teaching were provided.     Patient armband removed and shredded

## 2019-09-11 NOTE — H&P
3340 [facility] [place], Cite Mongi Slim, Red Jakob, MD Garnette Minder, [place] Mccall, [facility] V[facility]quez, [facility]   SCOTT [place], [facility]       Lucia Cervantes [facility] De Nixon 136    at [address], [address] Sandra Carlson  22.    [phone]    FAX: [address] [place] [address], [address]    [phone]    FAX: [phone]         PRE-PROCEDURE NOTE - EGD    H and P from last office visit reviewed. Allergies reviewed. Out-patient medicaton list reviewed. Patient Active Problem List   Diagnosis Code    Schizoaffective disorder, bipolar type [facility] F25.0    Cirrhosis of liver with ascites ([facility] K74.60, R18.8    Chronic hepatitis C without hepatic coma ([facility] B18.2    Alcohol abuse F10.10    Severe obesity ([facility] E66.01       Allergies   Allergen Reactions    Penicillins Other (comments)     Unsure of reaction, had when he was a child       No current facility-administered medications on file prior to encounter. Current Outpatient Medications on File Prior to Encounter   Medication Sig Dispense Refill    sertraline (ZOLOFT) 100 mg tablet Take  by mouth daily.  risperiDONE (RISPERDAL) 3 mg tablet Take  by mouth.  OXcarbazepine (TRILEPTAL) 300 mg tablet Take  by mouth.  risperiDONE microspheres (RISPERDAL CONSTA) 50 mg/2 mL injection 50 mg by IntraMUSCular route once. For EGD to assess for esophageal and gastric varices. Plan to perform banding if indicated based upon variceal size and appearance. The risks of the procedure were discussed with the patient. These included reaction to anesthesia, pain, perforation and bleeding. All questions were answered. The patient wishes to proceed with the procedure. PHYSICAL EXAMINATION:  VS: per nursing note  General: No acute distress. Eyes: Sclera anicteric. ENT: No oral lesions. Thyroid normal.  Nodes: No adenopathy. Skin: No spider angiomata. No jaundice. No palmar erythema. Respiratory: Lungs clear to auscultation. Cardiovascular: Regular heart rate. No murmurs. No JVD. Abdomen: Soft non-tender, liver size normal to percussion/palpation. Spleen not palpable. No obvious ascites. Extremities: No edema. No muscle wasting. No gross arthritic changes. Neurologic: Alert and oriented. Cranial nerves grossly intact. No asterixis. MOST RECENT LABORATORY STUDIES:  Lab Results   Component Value Date/Time    WBC 2.9 (L) 08/19/2019 10:23 AM    HGB 12.6 (L) 08/19/2019 10:23 AM    HCT 35.4 (L) 08/19/2019 10:23 AM    PLATELET 55 (L) 88/41/0978 10:23 AM    .0 (H) 08/19/2019 10:23 AM     Lab Results   Component Value Date/Time    INR 1.3 (H) 08/19/2019 10:23 AM    INR 1.4 (H) 07/22/2019 10:43 AM    Prothrombin time 15.9 (H) 08/19/2019 10:23 AM    Prothrombin time 16.5 (H) 07/22/2019 10:43 AM       ASSESSMENT AND PLAN:  EGD to assess for esophageal and/or gastric varices. Conscious sedation with fentanyl and versed.       MD [address], [address] [place] [address].  [phone]  [address] [place] [address]

## 2019-11-13 ENCOUNTER — OFFICE VISIT (OUTPATIENT)
Dept: HEMATOLOGY | Age: 59
End: 2019-11-13

## 2019-11-13 ENCOUNTER — HOSPITAL ENCOUNTER (OUTPATIENT)
Dept: LAB | Age: 59
Discharge: HOME | End: 2019-11-13
Payer: COMMERCIAL

## 2019-11-13 VITALS
TEMPERATURE: 97.5 F | BODY MASS INDEX: 35.55 KG/M2 | HEIGHT: 69 IN | WEIGHT: 240 LBS | DIASTOLIC BLOOD PRESSURE: 89 MMHG | SYSTOLIC BLOOD PRESSURE: 121 MMHG | HEART RATE: 101 BPM | OXYGEN SATURATION: 98 %

## 2019-11-13 DIAGNOSIS — B18.2 CHRONIC HEPATITIS C WITHOUT HEPATIC COMA (HCC): Primary | ICD-10-CM

## 2019-11-13 DIAGNOSIS — B18.2 CHRONIC HEPATITIS C WITHOUT HEPATIC COMA (HCC): ICD-10-CM

## 2019-11-13 LAB
ALBUMIN SERPL-MCNC: 2.7 G/DL (ref 3.4–5)
ALBUMIN/GLOB SERPL: 0.6 {RATIO} (ref 0.8–1.7)
ALP SERPL-CCNC: 149 U/L (ref 45–117)
ALT SERPL-CCNC: 45 U/L (ref 16–61)
ANION GAP SERPL CALC-SCNC: 7 MMOL/L (ref 3–18)
AST SERPL-CCNC: 49 U/L (ref 10–38)
BASOPHILS # BLD: 0 K/UL (ref 0–0.1)
BASOPHILS NFR BLD: 1 % (ref 0–2)
BILIRUB DIRECT SERPL-MCNC: 1.1 MG/DL (ref 0–0.2)
BILIRUB SERPL-MCNC: 1.7 MG/DL (ref 0.2–1)
BUN SERPL-MCNC: 14 MG/DL (ref 7–18)
BUN/CREAT SERPL: 12 (ref 12–20)
CALCIUM SERPL-MCNC: 8 MG/DL (ref 8.5–10.1)
CHLORIDE SERPL-SCNC: 102 MMOL/L (ref 100–111)
CO2 SERPL-SCNC: 28 MMOL/L (ref 21–32)
CREAT SERPL-MCNC: 1.2 MG/DL (ref 0.6–1.3)
DIFFERENTIAL METHOD BLD: ABNORMAL
EOSINOPHIL # BLD: 0.1 K/UL (ref 0–0.4)
EOSINOPHIL NFR BLD: 4 % (ref 0–5)
ERYTHROCYTE [DISTWIDTH] IN BLOOD BY AUTOMATED COUNT: 15.3 % (ref 11.6–14.5)
GLOBULIN SER CALC-MCNC: 4.7 G/DL (ref 2–4)
GLUCOSE SERPL-MCNC: 96 MG/DL (ref 74–99)
HCT VFR BLD AUTO: 34.8 % (ref 36–48)
HGB BLD-MCNC: 12.5 G/DL (ref 13–16)
INR PPP: 1.3 (ref 0.8–1.2)
LYMPHOCYTES # BLD: 1.4 K/UL (ref 0.9–3.6)
LYMPHOCYTES NFR BLD: 38 % (ref 21–52)
MCH RBC QN AUTO: 37.2 PG (ref 24–34)
MCHC RBC AUTO-ENTMCNC: 35.9 G/DL (ref 31–37)
MCV RBC AUTO: 103.6 FL (ref 74–97)
MONOCYTES # BLD: 0.4 K/UL (ref 0.05–1.2)
MONOCYTES NFR BLD: 11 % (ref 3–10)
NEUTS SEG # BLD: 1.7 K/UL (ref 1.8–8)
NEUTS SEG NFR BLD: 46 % (ref 40–73)
PLATELET # BLD AUTO: 54 K/UL (ref 135–420)
PMV BLD AUTO: 9 FL (ref 9.2–11.8)
POTASSIUM SERPL-SCNC: 3.5 MMOL/L (ref 3.5–5.5)
PROT SERPL-MCNC: 7.4 G/DL (ref 6.4–8.2)
PROTHROMBIN TIME: 16.4 SEC (ref 11.5–15.2)
RBC # BLD AUTO: 3.36 M/UL (ref 4.7–5.5)
SODIUM SERPL-SCNC: 137 MMOL/L (ref 136–145)
WBC # BLD AUTO: 3.5 K/UL (ref 4.6–13.2)

## 2019-11-13 PROCEDURE — 80076 HEPATIC FUNCTION PANEL: CPT

## 2019-11-13 PROCEDURE — 36415 COLL VENOUS BLD VENIPUNCTURE: CPT

## 2019-11-13 PROCEDURE — 85025 COMPLETE CBC W/AUTO DIFF WBC: CPT

## 2019-11-13 PROCEDURE — 82107 ALPHA-FETOPROTEIN L3: CPT

## 2019-11-13 PROCEDURE — 80048 BASIC METABOLIC PNL TOTAL CA: CPT

## 2019-11-13 PROCEDURE — 87522 HEPATITIS C REVRS TRNSCRPJ: CPT

## 2019-11-13 PROCEDURE — 85610 PROTHROMBIN TIME: CPT

## 2019-11-13 RX ORDER — OFLOXACIN 3 MG/ML
SOLUTION AURICULAR (OTIC)
Refills: 0 | COMMUNITY
Start: 2019-11-08

## 2019-11-13 NOTE — PROGRESS NOTES
[address] [facility], MD, MD Lele Chamorro PA-C Mickey Putt, [facility]     April CONNOR Vázquez, [facility]   Apison Elva, ZEB-AVA R[facility]       Lucia Prince De [place] [address]    at [address], [address] Sandra Carlson  22.    [phone]    FAX: [address], [address]    [phone]    FAX: [phone]       Patient Care Team:  Levi Mckinnon NP as PCP - General (Nurse Practitioner)  Francheska Luna MD (Neurology)      Problem List  Date Reviewed: 8/25/2019          Codes Class Noted    Severe obesity ([facility].) ICD-10-CM: E66.01  ICD-9-CM: 278.01  8/19/2019        Schizoaffective disorder, bipolar type ([facility].) ICD-10-CM: F25.0  ICD-9-CM: 295.70  7/22/2019        Cirrhosis of liver with ascites [facility]) ICD-10-CM: K74.60, R18.8  ICD-9-CM: 571.5  7/22/2019        Chronic hepatitis C without hepatic coma [facility]) ICD-10-CM: B18.2  ICD-9-CM: 070.54  7/22/2019        Alcohol abuse ICD-10-CM: F10.10  ICD-9-CM: 305.00  7/22/2019              [place] returns to the [address] for education and management of cirrhosis secondary to HCV and alcohol. The active problem list, all pertinent past medical history, medications, liver histology, endoscopic studies, radiologic findings and laboratory findings related to the liver disorder were reviewed with the patient. The patient is a 63year old  male who was found to have chronic liver disease and cirrhosis in 1990's. The patient was found to have cirrhosis when he used to live in [place].      An assessment of liver fibrosis with shear wave elastography was performed in 08/2019 and suggests a Metavir fibrosis score of F1. However the wide standard deviation and broad range brings the mean and medium into question. The patient clearly has cirrhosis. Serologic evaluation for markers of chronic liver disease was positive for HCV, genotype 1b. Hepatitis C RNA PCR 2,050,000. The patient has developed the following complications of cirrhosis: Ascites, hepatic encephalopathy. The patient has the following symptoms which are thought to be due to the liver disease: pain in the right side over the liver, yellowing of the eyes or skin, itching, problems concentrating, swelling of the abdomen, swelling of the lower extremities,     The patient has not experienced the following symptoms:  hematemesis, hematochezia. The patient has moderate to severe limitations in functional activities which can be attributed to the liver disease. and to other medical problems that are not related to the liver disease. Since the last office appointment the patient has:  Had no changes in the liver disease. 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 The diagnosis of cirrhosis is based upon laboratory studies, imaging, and complications of cirrhosis. The most recent laboratory studies indicate that the liver transaminases are elevated, alkaline phosphatase is elevated, tests of hepatic synthetic and metabolic function are depressed, and the platelet count is depressed. Will perform laboratory testing to monitor liver function and degree of liver injury. This will include hepatic panel, a CBC w/ diff, a BMP, a PT/INR, an ammonia level, and alcohol screen, and an AFP-L3%. Based upon laboratory studies the patient clearly has cirrhosis. We will perform laboratory studies in order to calculate the [facility] MELD score. CPT score is 8, Child's Yee Class is B, MELD score is 12. The patient is not a candidate for liver transplantation because of poor social support.     Chronic HCV   Chronic HCV with cirrhosis. Severity of the liver disease was assessed by laboratory studies  Will perform laboratory testing to monitor liver function and degree of liver injury. This included BMP, hepatic panel, CBC with platelet count, INR, and an HCV RNA. Serologic evaluation was negative for all causes of chronic liver disease. Chronic HCV Treatment  The patient has genotype 1B and is treatment naive. Discussed the treatment alternatives. The SVR/cure rate for HCV now exceeds 97% without significant side effects for most patients with HCV. The specific treatment is dependent upon genotype, viral load and histology. The patient should be treated for 12 weeks. Unfortunately, all current HCV treatment modalities are contraindicated with Trilpetal.   Neither the patient or his caregiver know why he is on the Trilpetal.  He denies ever having any seizure activity. I will reach out to Dr. Arden Newell and ask if he can change the medication to something that is okay to use with the current antiviral.  Oscar Witt is one anti-seizure medication that is not contraindicated with current antivirals. He will be treated with 12 weeks of Epclusa due to cirrhosis. This treatment regime was explained in detail, including dosing and side effects. Educational material was provided. Screening for Esophageal varices   The patient had an EGD to screen for esophageal varices in 09/2019. His esophagus is normal.    Repeat EGD in 09/2022. Hepatic encephalopathy   Overt HE has not developed to date. His ammonia level was 69 in 08/2019. Started him on lactulose 30 mL (20 grams) bid when the labs were resulted. Continue this dose. Repeat NH3 was ordered today. There is no need to restrict dietary protein at this time. Thrombocytopenia   This is secondary to cirrhosis. No treatment is needed at this time.      Treatment of other medical problems in patients with chronic liver disease  There are no contraindications for the patient to take most medications that are necessary for treatment of other medical issues. The patient has cirrhrosis and should avoid taking Benzodiazapines which could exacerbate HE.  NSAIDs which are associated with a higher rate of developing CORBY. The patient can take any medications utilized for treatment of DM or statins to treat hypercholesterolemia  The patient has recently stopped consuming alcohol. Regular alcohol use increases the risk of toxicity from acetaminophen. This analgesic should be avoided until the patient has been abstinent from alcohol for 6 months. Counseling for alcohol in patients with chronic liver disease  The patient was counseled regarding alcohol consumption and the effect of alcohol on chronic liver disease. The patient has cirrhosis and was advised to be abstinent from all alcohol including non-alcoholic beer which does contain some alcohol. Patient has been abstinent from alcohol since 05/2019. Osteoporosis  The risk of osteoporosis is increased in patients with cirrhosis. DEXA bone density to assess for osteoporosis      This should be ordered by the patients primary care physician. Vaccinations   Vaccination for viral hepatitis A and B is recommended. The patient does not have serologic evidence of prior exposure or vaccination with immunity. Routine vaccinations against other bacterial and viral agents can be performed as indicated. Annual flu vaccination should be administered if indicated. ALLERGIES  Allergies   Allergen Reactions    Penicillins Other (comments)     Unsure of reaction, had when he was a child       MEDICATIONS  Current Outpatient Medications   Medication Sig    lactulose (CHRONULAC) 10 gram/15 mL solution Take 30 mL by mouth two (2) times a day. Indications: impaired brain function due to liver disease    spironolactone (ALDACTONE) 100 mg tablet Take 1 Tab by mouth daily.     furosemide (LASIX) 20 mg tablet Take 1 Tab by mouth daily.  sertraline (ZOLOFT) 100 mg tablet Take  by mouth daily.  risperiDONE (RISPERDAL) 3 mg tablet Take  by mouth.  OXcarbazepine (TRILEPTAL) 300 mg tablet Take  by mouth.  risperiDONE microspheres (RISPERDAL CONSTA) 50 mg/2 mL injection 50 mg by IntraMUSCular route once. No current facility-administered medications for this visit. SYSTEM REVIEW NOT RELATED TO LIVER DISEASE OR REVIEWED ABOVE:  Constitution systems: Negative for fever, chills, weight gain, weight loss. Eyes: Negative for visual changes. ENT: Negative for sore throat, painful swallowing. Respiratory: Negative for cough, hemoptysis, SOB. Cardiology: Negative for chest pain, palpitations. GI:  Negative for constipation or diarrhea. : Negative for urinary frequency, dysuria, hematuria, nocturia. Skin: Negative for rash. Hematology: Negative for easy bruising, blood clots. Musculo-skelatal: Negative for back pain, muscle pain, weakness. Neurologic: Negative for headaches, dizziness, vertigo, memory problems not related to HE. Psychology: Negative for anxiety, depression. FAMILY HISTORY  The father Has/had the following following chronic disease(s): Prostate cancer, HTN, DM  The patient has no knowledge of the mother's medical condition. There is no family history of liver disease. There is no family history of immune disorders. SOCIAL HISTORY:  The patient is . The patient has 2 children. The patient currently smokes 1/2 pack of tobacco daily. The patient used to consume significant amount of alcohol- liquor, beer for over 46 years. The patient has been abstinent from alcohol since May 2019. Stopped using street drugs 5 or 6 years ago. Stopped IV drug use over 10 years ago. The patient does not work. The patient is applying for disability.          PHYSICAL EXAMINATION:  Visit Vitals  /89 (BP 1 Location: Left arm, BP Patient Position: Sitting)   Pulse (!) 101   Temp 97.5 °F (36.4 °C)   Ht 5' 8.5\" (1.74 m)   Wt 240 lb (108.9 kg)   SpO2 98%   BMI 35.96 kg/m²     General: No acute distress. Eyes: Sclera anicteric. ENT: No oral lesions. Thyroid normal.  Nodes: No adenopathy. Skin: +ve spider angiomata. No jaundice. No palmar erythema. Respiratory: Lungs clear to auscultation. Cardiovascular: Regular heart rate. No murmurs. No JVD. Abdomen: Soft non-tender. Liver size normal to percussion/palpation. Spleen not palpable. No obvious ascites. Extremities: No edema. No muscle wasting. No gross arthritic changes. Neurologic: Alert and oriented. Cranial nerves grossly intact. No asterixis.     LABORATORY STUDIES:  [facility] [place] of [address] [place] [address] & Units 8/19/2019 7/22/2019   WBC 4.6 - 13.2 K/uL 2.9 (L) 2.5 (L)   ANC 1.8 - 8.0 K/UL 1.6 (L) 1.2 (L)   HGB 13.0 - 16.0 g/dL 12.6 (L) 11.4 (L)    - 420 K/uL 55 (L) 52 (L)   INR 0.8 - 1.2   1.3 (H) 1.4 (H)   AST 10 - 38 U/L 83 (H) 87 (H)   ALT 16 - 61 U/L 50 45   Alk Phos 45 - 117 U/L 209 (H) 200 (H)   Bili, Total 0.2 - 1.0 MG/DL 1.6 (H) 1.6 (H)   Bili, Direct 0.0 - 0.2 MG/DL 1.0 (H) 1.0 (H)   Albumin 3.4 - 5.0 g/dL 2.6 (L) 2.4 (L)   BUN 7.0 - 18 MG/DL 14 10   Creat 0.6 - 1.3 MG/DL 1.08 1.05   Na 136 - 145 mmol/L 138 135 (L)   K 3.5 - 5.5 mmol/L 3.5 3.7   Cl 100 - 111 mmol/L 106 103   CO2 21 - 32 mmol/L 25 24   Glucose 74 - 99 mg/dL 92 86   Ammonia 11 - 32 UMOL/L 69 (H)      From 1/2019  AST/ALT/ALP/T Bili/ALB:217/82/129/2.6/2.9  WBC/HB/PLT/INR:2.4/12.4/55/1.48  BUN/CREAT:11/0.97    SEROLOGIES:  Serologies Latest Ref Rng & Units 7/22/2019   Hep A Ab, Total NEGATIVE   NEGATIVE   Hep B Surface Ag <1.00 Index <0.10   Hep B Surface Ag Interp NEG   NEGATIVE   Hep B Core Ab, Total NEGATIVE   NEGATIVE   Hep B Surface Ab >10.0 mIU/mL <3.10 (L)   Hep B Surface Ab Interp POS   NEGATIVE (A)   Ferritin 8 - 388 NG/ML 51   Iron % Saturation % 45     1/2019  anti-HCV positive,   Genotype 1b    LIVER HISTOLOGY:  08/2019. TRANSIENT HEPATIC ELASTOGRAPHY:   E Range: 7.74-12.42 kPa  E Mean: 8.63 kPa  E Median: 7.91 kPa  E Std: 2.0 kPa    ENDOSCOPIC PROCEDURES:  Not available or performed    RADIOLOGY:  08/2019. Ultrasound of the liver. Coarsened echotexture with lobular surface contour demonstrated. No focal mass. OTHER TESTING:  Not available or performed    FOLLOW-UP:  All of the issues listed above in the Assessment and Plan were discussed with the patient. All questions were answered. The patient expressed a clear understanding of the above. 30 minutes total time spent with this patient with more than 50% of this time spent counseling and coordinating care as described above. [address] in 12 weeks to review all data and determine the treatment plan.     SCOTT Jones  [facility]iver [place] of [address], [address] [place] [address] Xiomy Gage[address]   [phone]

## 2019-11-13 NOTE — PROGRESS NOTES
Yola Pérez is a 63 y.o. male      1. Have you been to the ER, urgent care clinic or hospitalized since your last visit? NO.     2. Have you seen or consulted any other health care providers outside of the [address] [place] [address] since your last visit (Include any pap smears or colon screening)? YES    Patient has seen PCP since last visit for routine checkup.            Learning Assessment 8/19/2019   PRIMARY LEARNER Patient   BARRIERS PRIMARY LEARNER HEARING   CO-LEARNER CAREGIVER No   PRIMARY LANGUAGE ENGLISH   LEARNER PREFERENCE PRIMARY LISTENING   ANSWERED BY PATIENT   RELATIONSHIP SELF

## 2019-11-14 LAB
AFP L3 MFR SERPL: 14.1 % (ref 0–9.9)
AFP SERPL-MCNC: 84.9 NG/ML (ref 0–8)

## 2019-11-15 LAB
HCV RNA SERPL NAA+PROBE-LOG IU: 6.34 HCV LOG 10IU/ML
HCV RNA SERPL PROBE AMP-ACNC: ABNORMAL HCVIU/ML

## 2020-01-21 ENCOUNTER — TELEPHONE (OUTPATIENT)
Dept: HEMATOLOGY | Age: 60
End: 2020-01-21

## 2020-01-21 NOTE — TELEPHONE ENCOUNTER
Called to schedule patient for his 3 month follow up with Yvon Walton NP. I was informed that the patient has moved to [place] and will no longer be receiving care at our office.

## 2020-09-10 ENCOUNTER — HOSPITAL ENCOUNTER (INPATIENT)
Age: 60
LOS: 5 days | Discharge: HOME | End: 2020-09-15
Attending: FAMILY MEDICINE | Admitting: FAMILY MEDICINE
Payer: COMMERCIAL

## 2020-09-10 PROCEDURE — 81001 URINALYSIS AUTO W/SCOPE: CPT

## 2020-09-10 PROCEDURE — 80053 COMPREHEN METABOLIC PANEL: CPT

## 2020-09-10 PROCEDURE — 86850 RBC ANTIBODY SCREEN: CPT

## 2020-09-10 PROCEDURE — U0002 COVID-19 LAB TEST NON-CDC: HCPCS

## 2020-09-10 PROCEDURE — 86592 SYPHILIS TEST NON-TREP QUAL: CPT

## 2020-09-10 PROCEDURE — 87635 SARS-COV-2 COVID-19 AMP PRB: CPT

## 2020-09-10 PROCEDURE — 93005 ELECTROCARDIOGRAM TRACING: CPT

## 2020-09-10 PROCEDURE — 99999999999 HC CONV PATIENT CONVENIENCE-OTHER

## 2020-09-10 PROCEDURE — 80307 DRUG TEST PRSMV CHEM ANLYZR: CPT

## 2020-09-10 PROCEDURE — 93010 ELECTROCARDIOGRAM REPORT: CPT

## 2020-09-10 PROCEDURE — 85027 COMPLETE CBC AUTOMATED: CPT

## 2020-09-10 PROCEDURE — 82746 ASSAY OF FOLIC ACID SERUM: CPT

## 2020-09-11 DIAGNOSIS — F10.231 ALCOHOL WITHDRAWAL DELIRIUM, ACUTE, MIXED LEVEL OF ACTIVITY (HCC): Primary | ICD-10-CM

## 2020-09-11 DIAGNOSIS — F10.10 ALCOHOL ABUSE: ICD-10-CM

## 2020-09-11 DIAGNOSIS — B18.2 CHRONIC HEPATITIS C WITHOUT HEPATIC COMA (HCC): ICD-10-CM

## 2020-09-11 DIAGNOSIS — D61.818 PANCYTOPENIA (HCC): Chronic | ICD-10-CM

## 2020-09-11 DIAGNOSIS — F25.0 SCHIZOAFFECTIVE DISORDER, BIPOLAR TYPE (HCC): ICD-10-CM

## 2020-09-11 DIAGNOSIS — K70.31 ALCOHOLIC CIRRHOSIS OF LIVER WITH ASCITES (HCC): ICD-10-CM

## 2020-09-11 PROCEDURE — 80074 ACUTE HEPATITIS PANEL: CPT

## 2020-09-11 PROCEDURE — 36415 COLL VENOUS BLD VENIPUNCTURE: CPT

## 2020-09-11 PROCEDURE — 84132 ASSAY OF SERUM POTASSIUM: CPT

## 2020-09-11 PROCEDURE — 99999999999 HC CONV PATIENT CONVENIENCE-OTHER

## 2020-09-11 PROCEDURE — 85027 COMPLETE CBC AUTOMATED: CPT

## 2020-09-11 PROCEDURE — 84295 ASSAY OF SERUM SODIUM: CPT

## 2020-09-11 RX ORDER — FOLIC ACID 1 MG/1
1 TABLET ORAL DAILY
COMMUNITY

## 2020-09-11 RX ORDER — FLUOXETINE HYDROCHLORIDE 40 MG/1
40 CAPSULE ORAL DAILY
COMMUNITY

## 2020-09-11 RX ORDER — FERROUS SULFATE 220 (44)/5
325 SOLUTION, ORAL ORAL DAILY
COMMUNITY

## 2020-09-11 RX ORDER — LANOLIN ALCOHOL/MO/W.PET/CERES
100 CREAM (GRAM) TOPICAL DAILY
COMMUNITY

## 2020-09-12 LAB
ANION GAP SERPL CALC-SCNC: 5 MMOL/L
BASOPHILS # BLD: 0 K/UL
BASOPHILS NFR BLD: 1 %
BUN SERPL-MCNC: 12 MG/DL (ref 9–21)
BUN/CREAT SERPL: 10
CA-I BLD-MCNC: 7.9 MG/DL (ref 8.5–10.5)
CHLORIDE SERPL-SCNC: 102 MMOL/L (ref 94–111)
CO2 SERPL-SCNC: 24 MMOL/L (ref 21–33)
CREAT SERPL-MCNC: 1.2 MG/DL (ref 0.8–1.5)
EOSINOPHIL # BLD: 0 K/UL
EOSINOPHIL NFR BLD: 0 %
ERYTHROCYTE [DISTWIDTH] IN BLOOD BY AUTOMATED COUNT: 14 %
GLUCOSE SERPL-MCNC: 108 MG/DL (ref 70–110)
HCT VFR BLD AUTO: 23.7 % (ref 36–48)
HGB BLD-MCNC: 8 % (ref 13–16)
IMM GRANULOCYTES # BLD AUTO: 0 K/UL
IMM GRANULOCYTES NFR BLD AUTO: 0 %
LYMPHOCYTES # BLD: 0.6 K/UL
LYMPHOCYTES NFR BLD: 31 %
MCH RBC QN AUTO: 39.2 PG (ref 24–34)
MCHC RBC AUTO-ENTMCNC: 33.8 G/DL (ref 31–37)
MCV RBC AUTO: 116.2 FL (ref 74–97)
MONOCYTES # BLD: 0.2 K/UL
MONOCYTES NFR BLD: 10 %
NEUTS BAND NFR BLD MANUAL: 1 %
NEUTS SEG # BLD: 1.1 K/UL
NEUTS SEG NFR BLD: 57 %
PLATELET # BLD AUTO: 33 K/UL (ref 135–420)
PMV BLD AUTO: 10.1 FL
POTASSIUM SERPL-SCNC: 3.7 MMOL/L (ref 3.2–5.1)
RBC # BLD AUTO: 2.04 M/UL (ref 4.7–5.5)
RBC MORPH BLD: ABNORMAL
RBC MORPH BLD: ABNORMAL
SODIUM SERPL-SCNC: 131 MMOL/L (ref 135–145)
WBC # BLD AUTO: 1.9 K/UL (ref 4.6–13.2)

## 2020-09-12 PROCEDURE — 36415 COLL VENOUS BLD VENIPUNCTURE: CPT

## 2020-09-12 PROCEDURE — 80048 BASIC METABOLIC PNL TOTAL CA: CPT

## 2020-09-12 PROCEDURE — 74011250637 HC RX REV CODE- 250/637: Performed by: FAMILY MEDICINE

## 2020-09-12 PROCEDURE — 65270000029 HC RM PRIVATE

## 2020-09-12 PROCEDURE — 74011250637 HC RX REV CODE- 250/637

## 2020-09-12 PROCEDURE — 85025 COMPLETE CBC W/AUTO DIFF WBC: CPT

## 2020-09-12 RX ORDER — BISMUTH SUBSALICYLATE 262 MG
1 TABLET,CHEWABLE ORAL DAILY
Status: DISCONTINUED | OUTPATIENT
Start: 2020-09-12 | End: 2020-09-15 | Stop reason: HOSPADM

## 2020-09-12 RX ORDER — TRAZODONE HYDROCHLORIDE 50 MG/1
TABLET ORAL
Status: COMPLETED
Start: 2020-09-12 | End: 2020-09-12

## 2020-09-12 RX ORDER — LORAZEPAM 1 MG/1
1 TABLET ORAL
Status: COMPLETED | OUTPATIENT
Start: 2020-09-14 | End: 2020-09-14

## 2020-09-12 RX ORDER — LORAZEPAM 1 MG/1
2 TABLET ORAL
Status: DISCONTINUED | OUTPATIENT
Start: 2020-09-12 | End: 2020-09-15 | Stop reason: HOSPADM

## 2020-09-12 RX ORDER — LORAZEPAM 1 MG/1
TABLET ORAL
Status: COMPLETED
Start: 2020-09-12 | End: 2020-09-12

## 2020-09-12 RX ORDER — LANOLIN ALCOHOL/MO/W.PET/CERES
CREAM (GRAM) TOPICAL
Status: DISCONTINUED
Start: 2020-09-12 | End: 2020-09-12 | Stop reason: SDUPTHER

## 2020-09-12 RX ORDER — LORAZEPAM 1 MG/1
1 TABLET ORAL EVERY 12 HOURS
Status: DISPENSED | OUTPATIENT
Start: 2020-09-13 | End: 2020-09-13

## 2020-09-12 RX ORDER — IBUPROFEN 200 MG
1 TABLET ORAL DAILY
Status: DISCONTINUED | OUTPATIENT
Start: 2020-09-12 | End: 2020-09-15 | Stop reason: HOSPADM

## 2020-09-12 RX ORDER — DOCUSATE SODIUM 100 MG/1
100 CAPSULE, LIQUID FILLED ORAL
Status: DISCONTINUED | OUTPATIENT
Start: 2020-09-12 | End: 2020-09-15 | Stop reason: HOSPADM

## 2020-09-12 RX ORDER — LORAZEPAM 1 MG/1
1 TABLET ORAL 3 TIMES DAILY
Status: DISPENSED | OUTPATIENT
Start: 2020-09-12 | End: 2020-09-13

## 2020-09-12 RX ORDER — LANOLIN ALCOHOL/MO/W.PET/CERES
CREAM (GRAM) TOPICAL
Status: COMPLETED
Start: 2020-09-12 | End: 2020-09-12

## 2020-09-12 RX ORDER — POTASSIUM CHLORIDE 750 MG/1
20 TABLET, EXTENDED RELEASE ORAL DAILY
Status: DISCONTINUED | OUTPATIENT
Start: 2020-09-12 | End: 2020-09-15 | Stop reason: HOSPADM

## 2020-09-12 RX ORDER — TRAZODONE HYDROCHLORIDE 50 MG/1
100 TABLET ORAL
Status: DISCONTINUED | OUTPATIENT
Start: 2020-09-12 | End: 2020-09-15 | Stop reason: HOSPADM

## 2020-09-12 RX ORDER — IBUPROFEN 600 MG/1
600 TABLET ORAL
Status: DISCONTINUED | OUTPATIENT
Start: 2020-09-12 | End: 2020-09-15 | Stop reason: HOSPADM

## 2020-09-12 RX ORDER — MAG HYDROX/ALUMINUM HYD/SIMETH 200-200-20
30 SUSPENSION, ORAL (FINAL DOSE FORM) ORAL
Status: DISCONTINUED | OUTPATIENT
Start: 2020-09-12 | End: 2020-09-15 | Stop reason: HOSPADM

## 2020-09-12 RX ORDER — FOLIC ACID 1 MG/1
1 TABLET ORAL DAILY
Status: DISCONTINUED | OUTPATIENT
Start: 2020-09-12 | End: 2020-09-15 | Stop reason: HOSPADM

## 2020-09-12 RX ORDER — CALCIUM CARBONATE 200(500)MG
400 TABLET,CHEWABLE ORAL
Status: DISCONTINUED | OUTPATIENT
Start: 2020-09-12 | End: 2020-09-15 | Stop reason: HOSPADM

## 2020-09-12 RX ORDER — LORAZEPAM 1 MG/1
2 TABLET ORAL 3 TIMES DAILY
Status: COMPLETED | OUTPATIENT
Start: 2020-09-12 | End: 2020-09-12

## 2020-09-12 RX ORDER — SPIRONOLACTONE 25 MG/1
100 TABLET ORAL DAILY
Status: DISCONTINUED | OUTPATIENT
Start: 2020-09-12 | End: 2020-09-15 | Stop reason: HOSPADM

## 2020-09-12 RX ORDER — LORAZEPAM 1 MG/1
TABLET ORAL
Status: DISPENSED
Start: 2020-09-12 | End: 2020-09-13

## 2020-09-12 RX ORDER — ACETAMINOPHEN 325 MG/1
650 TABLET ORAL
Status: DISCONTINUED | OUTPATIENT
Start: 2020-09-12 | End: 2020-09-15 | Stop reason: HOSPADM

## 2020-09-12 RX ORDER — FLUOXETINE HYDROCHLORIDE 20 MG/1
40 CAPSULE ORAL DAILY
Status: DISCONTINUED | OUTPATIENT
Start: 2020-09-12 | End: 2020-09-15 | Stop reason: HOSPADM

## 2020-09-12 RX ORDER — ASPIRIN 325 MG/1
100 TABLET, FILM COATED ORAL DAILY
Status: DISCONTINUED | OUTPATIENT
Start: 2020-09-12 | End: 2020-09-15 | Stop reason: HOSPADM

## 2020-09-12 RX ORDER — LANOLIN ALCOHOL/MO/W.PET/CERES
1 CREAM (GRAM) TOPICAL
Status: DISCONTINUED | OUTPATIENT
Start: 2020-09-12 | End: 2020-09-15 | Stop reason: HOSPADM

## 2020-09-12 RX ADMIN — TRAZODONE HYDROCHLORIDE 100 MG: 50 TABLET ORAL at 21:34

## 2020-09-12 RX ADMIN — FERROUS SULFATE TAB 325 MG (65 MG ELEMENTAL FE) 325 MG: 325 (65 FE) TAB at 12:01

## 2020-09-12 RX ADMIN — LORAZEPAM 2 MG: 1 TABLET ORAL at 16:45

## 2020-09-12 RX ADMIN — LORAZEPAM 2 MG: 1 TABLET ORAL at 09:59

## 2020-09-12 RX ADMIN — FERROUS SULFATE TAB 325 MG (65 MG ELEMENTAL FE) 325 MG: 325 (65 FE) TAB at 10:00

## 2020-09-12 RX ADMIN — LORAZEPAM 1 MG: 1 TABLET ORAL at 21:36

## 2020-09-12 RX ADMIN — Medication 100 MG: at 10:06

## 2020-09-12 RX ADMIN — SPIRONOLACTONE 100 MG: 25 TABLET ORAL at 10:06

## 2020-09-12 RX ADMIN — FERROUS SULFATE TAB 325 MG (65 MG ELEMENTAL FE) 325 MG: 325 (65 FE) TAB at 16:48

## 2020-09-12 RX ADMIN — FOLIC ACID 1 MG: 1 TABLET ORAL at 10:00

## 2020-09-12 RX ADMIN — LORAZEPAM 1 MG: 1 TABLET ORAL at 21:00

## 2020-09-12 RX ADMIN — MULTIVITAMIN TABLET 1 TABLET: TABLET at 10:00

## 2020-09-12 RX ADMIN — FLUOXETINE 40 MG: 20 CAPSULE ORAL at 09:59

## 2020-09-12 RX ADMIN — POTASSIUM CHLORIDE 20 MEQ: 750 TABLET, EXTENDED RELEASE ORAL at 10:00

## 2020-09-12 NOTE — BH NOTES
Chemical Dependency/Substance Abuse Therapist/Counselor Documentation    /THERAPIST PROGRESS NOTE    CURRENT STATUS OF PATIENT: Orientation (check one) [x] Person [x] Place [x] Time [x] Purpose    (Patient voiced concerns: No concerns mentioned.  Jack Villalobos was alert and oriented x4. )    Attitude/Behavior toward Examiner:   [x] Appropriate [x] Cooperative [] Aggressive [] Argumentative [] Angry [] Apathetic [] Passive  [] Childlike [] Interactive [] Guarded [] Demanding [] Dramatic [] Evasive [] Hostile [] Irritable [] Manipulative   [] Uncooperative [] Difficult to redirect [] Isolative-Withdrawal [] Sad-Tearful [] Suspicious [] Defensive    TREATMENT PLAN PROBLEM BEING ADDRESS: Relapse Prevention    MODALITY:  [x] Individual Therapy  [x] Group Therapy  [] Family Therapy with Client Present  [] Family Therapy without Client Present  [] Multi-Family Group Therapy  [x] Spirituality Psychotherapy Group  [x] Psycho-Edutherapy Group    RISK OF HARM:    [x] None identified    [] Self injurious behavior  [] Threatening others without a plan   [] Actively Suicidal with plan  [] Threatening others with plan  [] Suicidal Thoughts without plan [] Weapon in the home  [] Means to complete a plan    Comments: No risk identified     General Appearance: [x] Normal [] Disheveled [] Emaciated [] Obese [] Poor Hygiene    Dress: [x] Appropriate [] Eccentric [] Seductive [] Bizarre    Mood: [x] Euthymic/normal [] Dysphoric/unease [] Anxious [] Agitated [] Dysthymic [] Depressed [] Euphoric [] Pleasant  [] Bright [] Angry [] Manic    Affect: [x] Appropriate [] Inappropriate [] Labile [] Constricted [] Blunted [] Flat [] Lethargic [] Preoccupied    THOUGHT CONTENT:   [x] Remains focused on topic/thought control [] Unable to remain focused on topic [] Blocking [] Disorganized  [] Confused [] Preoccupied [] Flight of ideas [] Tangential [] Delusional thought content [] Persecutory  [] Bizarre [] Grandeur [] Guilt [] Somatic PERCEPTION: [x] Normal [] Hallucinations [] Auditory [] Visual [] Tactile [] Olfactory/smell [] Gustatory/taste    Briefly summarize the specifics of the identified symptoms and/or behaviors listed and progress towards improvement and/or group interactions: Vielka Bradford attended all groups. Vielka Bradford was alert and oriented x4. Vielka Bradford was able to connect to the group topics, and was able to provide personal, applicable examples that were in relation to the topics. MET (Motivational Enhancement Therapy): Based on discussions and interactions with the patient the patient falls within the following stage of treatment:   [] Pre-contemplation: Denial of illness with no need to change  [] Contemplation: Awareness of problem and considering change  [x] Determination: Willing to change and open to possible change  [] Action: Actively involved in recovery/treatment demonstrates a willing to make plans to change  [] Relapse Prevention: Able to verbalize plans to prevent relapse, 7 day plan    Briefly summarize the patient's interactions/discussions indicating the current or change in level of therapeutic treatment: Vielka Bradford identified wanting to maintain his sobriety once he successfully completes detox. TREATMENT PROVIDED:  [] Developed/Reviewed Crisis Prevention Plan  [x] Discussed/Reviewed Treatment Goals on Treatment Plan  [] Discussed/Reviewed Discharge Planning   [] Discussed/Reviewed the patient goals of treatment  [] Refused/Unable to participate in discharge planning [] Other:     RECOMMENDATIONS: [] Change current therapeutic focus  [] Change treatment goals/objectives on the treatment plan    CONTINUED PLAN OF CARE: Vielka Bradford will continue to work towards creating a relapse prevention plan, that will assist him with maintaining sobriety upon discharge.     DISCHARGE PLANNING:   [] Has identified a family support system   [] Patient has not identified a family support system  [] Has connected with sober/clean support system  [] Patient has not connected with sober/clean support system  [] Patient uses special equipment at home and equipment is in the home  [] Has identified community support    [x] Patient has not been able to identify community support  Barriers to Discharge:  [] Difficulty returning to present living arrangement  [] Will require assistance with placement post discharge  [] There is no local substance abuse disorder programs available to the patient  [] Needs referral to community resources     Discharge Planning Comments: Counselor will provide Rodger Luis with assistance with finding available community resources.      TATY Bernal, QMHP-A, CSAC-C

## 2020-09-12 NOTE — GROUP NOTE
[facility] GROUP DOCUMENTATION INDIVIDUAL Group Therapy Note Date: 9/12/2020 Group Start Time: 1000 Group End Time: 7735 Group Topic: CBT 
 
SHF 3 DETOX Arturo Milfay [facility] GROUP DOCUMENTATION GROUP Group Topic: Anger Management Attendees: 2 Attendance: yes Patient's Goal: Developing a set of skills necessary to understanding the factors which contribute to the development of his addiction. Interventions/techniques: Instructed/Educated; supportive; problem solving Follows Directions: Yes Interactions: attentive/supportive Mental Status: Appropriate Behavior/appearance: Poor grooming Goals Achieved: Anthony Gallo was supported in gaining understanding of anger and gression. He was able to list three situations, topics, or people that often lead him to feeling angry. He was supported with developing a list of what he does when he is angry. He was also supported in listed problems that he have run into because of his anger. Additional Notes:  
 
Mckenna Enriquez

## 2020-09-12 NOTE — BH NOTES
IP  GROUP DOCUMENTATION GROUP      Group Therapy Note    Topic: Community & Spirituality    Staff supported patient with an overview of today's schedule, group rules, hygiene, smoke breaks, medication time, tidiness of the room and concerns. Patient did not report any concerns or issues.         Attendees: 2 Last 5 Patient Entered Redings Current 30 Day Average: 116/68     Recent Readings 8/27/2017 8/27/2017 8/27/2017 8/16/2017 8/16/2017    Systolic BP (mmHg) 114 131 110 118 110    Diastolic BP (mmHg) 73 74 74 62 61    Pulse 74 80 78 81 83          Hypertension Digital Medicine (HDMP) Health  Follow Up    LVM to follow up with Mrs. Jyoti Tyler.    Per 30 day average, blood pressure is well controlled 116/68 mmHg. Encouraged adherence to low sodium diet and physical activity guidelines. Requested patient start taking more BP readings. Reminded her that it is a requirement of the program for her to take at least one BP reading each week in order for us to continue monitor her. Will continue to monitor/follow up.

## 2020-09-12 NOTE — PROGRESS NOTES
Problem: Alcohol Withdrawal  Goal: *STG: Vital signs within defined limits  Outcome: Progressing Towards Goal

## 2020-09-12 NOTE — PROGRESS NOTES
Verbal shift change report given to Tiffanie Ochoa (oncoming nurse) by Cece Graham RN (offgoing nurse). Report included the following information Kardex and MAR.

## 2020-09-12 NOTE — GROUP NOTE
[facility] GROUP DOCUMENTATION INDIVIDUAL Group Therapy Note Date: 9/12/2020 Group Start Time: 4559 Group End Time: 4147 Group Topic: Relapse Prevention/Role Play Group SHF 3 DETOX Cari Gillespie [facility] GROUP DOCUMENTATION GROUP Group Therapy Note Topic: 12 steps Attendees: 2 Attendance: Attended Patient's Goal:  Verbalize relapse prevention strategies and plan Interventions/techniques: Informed, Provide feedback and Supported Follows Directions: Followed directions Interactions: Interacted appropriately Mental Status: Calm Behavior/appearance: Cooperative Goals Achieved: Able to engage in interactions, Able to listen to others, Able to give feedback to another and Identified relapse prevention strategies Additional Notes:  N/A TATY Cody, QMHP-A, CSAC-C

## 2020-09-12 NOTE — GROUP NOTE
[facility] GROUP DOCUMENTATION INDIVIDUAL Group Therapy Note Date: 9/12/2020 Group Start Time: 200 Group End Time: 1930 Group Topic: Education Group - Inpatient SHF 3 DETOX [place] [facility] GROUP DOCUMENTATION GROUP Group Therapy Note Attendees: All Attendance: Attended Patient's Goal:  Relapse Prevention Interventions/techniques: Provide feedback and Supported Follows Directions: Followed directions Interactions: Interacted appropriately Mental Status: Calm Behavior/appearance: Attentive Goals Achieved: Able to engage in interactions Additional Notes:   
 
Garold Bloch, MSW, QMHP-A, CSAC-C

## 2020-09-12 NOTE — PROGRESS NOTES
Negative results from COVID-19 test received via fax for lab. Patient and nurse supervisor notified of results. Standard precaution implement.

## 2020-09-12 NOTE — PROGRESS NOTES
Progress Note    Patient: Maurice Lipscomb MRN: [id number]  SSN: [id number]    YOB: 1960  Age: 62 y.o. Sex: male      Admit Date: 9/10/2020    LOS: 2 days     Subjective: The patient is a 78-year-old male who is seen for follow-up for detox. No nausea vomiting or diarrhea. No chest congestion or cough. He has had low platelet count we talked with his  yesterday. She was not aware about all of his medical issues. He has severe liver disease secondary to a very long history of alcohol use the nurses report he is had a fairly uneventful night he has been eating better walking around the room he is still in isolation due to COVID issues. We are going to check blood work today to reevaluate his platelet count and also his sodium. He has been eating. Bowel movements have been appropriate he is been a little shaky. A little sweating as well but doing relatively well with medical detox. Objective:     Vitals:    09/11/20 1523   Weight: 79.4 kg (174 lb 15.7 oz)   Height: 5' 7\" (1.702 m)        Intake and Output:  Current Shift: No intake/output data recorded. Last three shifts: No intake/output data recorded. Physical Exam:       Lab/Data Review:      Lab work will be ordered today there is no low new lab work available on the chart at this time. Assessment:     Active Problems:    Alcohol withdrawal delirium, acute, mixed level of activity ([facility] [address] (9/11/2020)        Plan:     He is medically stable with his detoxification at this time. We had a long discussion with him for the last 2 days about the significant liver disease and thrombocytopenia that he has and that continued alcohol use will result in probably death in the next 2 or 3 months if he does not completely stop we talked with his  yesterday. I really believe he needs a long-term care placement to prevent any more alcohol use.   She is aware of our concerns and she apologized for not letting us know all of the medical issues he had before he came here. We are rechecking his blood work today. He seems medically stable there are no signs of bleeding. He has been eating appropriately. We will follow him on a daily basis. Our hope is to get him to a point where we can either get him into some long-term placement or long-term 30-day programming at this point.     Signed By: Donavan Daly MD     September 12, 2020

## 2020-09-12 NOTE — ROUTINE PROCESS
Verbal shift change report given to Stefani Victoria RN  (oncoming nurse) by Mariana Adams (offgoing nurse). Report included the following information Kardex and MAR.

## 2020-09-12 NOTE — GROUP NOTE
[facility] GROUP DOCUMENTATION INDIVIDUAL Group Therapy Note Date: 9/12/2020 Group Start Time: 7916 Group End Time: 1200 Group Topic: CBT 
 
SHF 3 DETOX Shyanne Daley [facility] GROUP DOCUMENTATION GROUP Group Therapy Note Topic: Stress Management Attendees: 2 Attendance: Yes Patient's Goal: Verbalize specific instances of the negative consequences of impulsivity and addiction. Interventions/techniques: Instructed/Educated; supportive; problem solving; encourage self disclosure Follows Directions: Yes Interactions:Participated Mental Status: Some what labile Behavior/appearance:Poor grooming Goals Achieved: Patient was supported in recognizing the way he experiences stress, physically, emotionally and behaviorally. He was able to identify 2 things that he can do to reduce the symptoms or times when he notices these symptoms are less intense. Additional Notes:   
 
Alberto Goyal

## 2020-09-12 NOTE — ROUTINE PROCESS
Received patient for care alert and oriented x 4, skin moist and flushed. Tremor can be felt fingertip to fingertip. Denies complaint of GI upset. No complaint of pain. Noted anxiety. Denies c/o hallucinations. Report of slept well last night. Pleasant and polite. No distress noted.

## 2020-09-13 LAB
AMMONIA PLAS-SCNC: 51 UMOL/L (ref 9–33)
BASOPHILS # BLD: 0 K/UL (ref 0–0.1)
BASOPHILS NFR BLD: 0 % (ref 0–2)
EOSINOPHIL # BLD: 0 K/UL (ref 0–0.4)
EOSINOPHIL NFR BLD: 0 % (ref 0–5)
ERYTHROCYTE [DISTWIDTH] IN BLOOD BY AUTOMATED COUNT: 14.2 %
HCT VFR BLD AUTO: 22.5 % (ref 36–48)
HGB BLD-MCNC: 7.6 % (ref 13–16)
IMM GRANULOCYTES # BLD AUTO: 0 K/UL
IMM GRANULOCYTES NFR BLD AUTO: 0 %
LYMPHOCYTES # BLD: 1.2 K/UL (ref 0.9–3.6)
LYMPHOCYTES NFR BLD: 56 % (ref 21–52)
MCH RBC QN AUTO: 39.4 PG (ref 24–34)
MCHC RBC AUTO-ENTMCNC: 33.8 G/DL (ref 31–37)
MCV RBC AUTO: 116.6 FL (ref 74–97)
MONOCYTES # BLD: 0 K/UL (ref 0.05–1.2)
MONOCYTES NFR BLD: 1 % (ref 3–10)
NEUTS BAND NFR BLD MANUAL: 2 %
NEUTS SEG # BLD: 0.9 K/UL (ref 1.8–8)
NEUTS SEG NFR BLD: 41 % (ref 40–73)
PLATELET # BLD AUTO: 37 K/UL (ref 135–420)
PMV BLD AUTO: 9.9 FL
RBC # BLD AUTO: 1.93 M/UL (ref 4.7–5.5)
RBC MORPH BLD: ABNORMAL
RBC MORPH BLD: ABNORMAL
WBC # BLD AUTO: 2.1 K/UL (ref 4.6–13.2)

## 2020-09-13 PROCEDURE — 36415 COLL VENOUS BLD VENIPUNCTURE: CPT

## 2020-09-13 PROCEDURE — 85025 COMPLETE CBC W/AUTO DIFF WBC: CPT

## 2020-09-13 PROCEDURE — 82140 ASSAY OF AMMONIA: CPT

## 2020-09-13 PROCEDURE — 65270000029 HC RM PRIVATE

## 2020-09-13 PROCEDURE — 74011250637 HC RX REV CODE- 250/637: Performed by: FAMILY MEDICINE

## 2020-09-13 RX ORDER — FLUOXETINE HYDROCHLORIDE 20 MG/1
CAPSULE ORAL
Status: DISPENSED
Start: 2020-09-13 | End: 2020-09-13

## 2020-09-13 RX ORDER — FOLIC ACID 1 MG/1
TABLET ORAL
Status: DISPENSED
Start: 2020-09-13 | End: 2020-09-13

## 2020-09-13 RX ORDER — POTASSIUM CHLORIDE 750 MG/1
TABLET, EXTENDED RELEASE ORAL
Status: DISPENSED
Start: 2020-09-13 | End: 2020-09-13

## 2020-09-13 RX ORDER — SPIRONOLACTONE 25 MG/1
TABLET ORAL
Status: DISPENSED
Start: 2020-09-13 | End: 2020-09-13

## 2020-09-13 RX ORDER — LORAZEPAM 1 MG/1
TABLET ORAL
Status: DISPENSED
Start: 2020-09-13 | End: 2020-09-13

## 2020-09-13 RX ORDER — IBUPROFEN 200 MG
TABLET ORAL
Status: DISPENSED
Start: 2020-09-13 | End: 2020-09-13

## 2020-09-13 RX ORDER — BISMUTH SUBSALICYLATE 262 MG
TABLET,CHEWABLE ORAL
Status: DISPENSED
Start: 2020-09-13 | End: 2020-09-13

## 2020-09-13 RX ORDER — LANOLIN ALCOHOL/MO/W.PET/CERES
CREAM (GRAM) TOPICAL
Status: DISPENSED
Start: 2020-09-13 | End: 2020-09-13

## 2020-09-13 RX ADMIN — LORAZEPAM 1 MG: 1 TABLET ORAL at 09:56

## 2020-09-13 RX ADMIN — FLUOXETINE 40 MG: 20 CAPSULE ORAL at 09:56

## 2020-09-13 RX ADMIN — FERROUS SULFATE TAB 325 MG (65 MG ELEMENTAL FE) 325 MG: 325 (65 FE) TAB at 09:56

## 2020-09-13 RX ADMIN — MULTIVITAMIN TABLET 1 TABLET: TABLET at 09:56

## 2020-09-13 RX ADMIN — FERROUS SULFATE TAB 325 MG (65 MG ELEMENTAL FE) 325 MG: 325 (65 FE) TAB at 12:28

## 2020-09-13 RX ADMIN — SPIRONOLACTONE 100 MG: 25 TABLET ORAL at 09:54

## 2020-09-13 RX ADMIN — POTASSIUM CHLORIDE 20 MEQ: 750 TABLET, EXTENDED RELEASE ORAL at 09:55

## 2020-09-13 RX ADMIN — TRAZODONE HYDROCHLORIDE 100 MG: 50 TABLET ORAL at 21:30

## 2020-09-13 RX ADMIN — LORAZEPAM 1 MG: 1 TABLET ORAL at 15:16

## 2020-09-13 RX ADMIN — LORAZEPAM 1 MG: 1 TABLET ORAL at 21:31

## 2020-09-13 RX ADMIN — FOLIC ACID 1 MG: 1 TABLET ORAL at 09:55

## 2020-09-13 RX ADMIN — Medication 100 MG: at 09:55

## 2020-09-13 RX ADMIN — LACTULOSE 30 ML: 20 SOLUTION ORAL at 15:14

## 2020-09-13 RX ADMIN — FERROUS SULFATE TAB 325 MG (65 MG ELEMENTAL FE) 325 MG: 325 (65 FE) TAB at 16:45

## 2020-09-13 NOTE — PROGRESS NOTES
Problem: Alcohol Withdrawal  Goal: *STG: Participates in treatment plan  9/13/2020 0457 by Rupert Hartman  Outcome: Progressing Towards Goal  9/13/2020 0452 by Rupert Hartman  Outcome: Progressing Towards Goal  Goal: *STG: Remains safe in hospital  9/13/2020 0457 by Rupert Hartman  Outcome: Progressing Towards Goal  9/13/2020 0452 by Rupert Hartman  Outcome: Progressing Towards Goal  Goal: *STG: Complies with medication therapy  9/13/2020 0457 by Rupert Hartman  Outcome: Progressing Towards Goal  9/13/2020 0452 by Rupert Hartman  Outcome: Progressing Towards Goal  Goal: *STG: Attends activities and groups  Outcome: Progressing Towards Goal  Goal: *STG: Will identify negative impact of chemical dependency including the use of tobacco, alcohol, and other substances  Outcome: Progressing Towards Goal  Goal: *STG: Maintains appropriate nutrition and hydration  Outcome: Progressing Towards Goal  Goal: *STG: Vital signs within defined limits  Outcome: Progressing Towards Goal

## 2020-09-13 NOTE — PROGRESS NOTES
Progress Note    Patient: Devan Hough MRN: [id number]  SSN: [id number]    YOB: 1960  Age: 62 y.o. Sex: male      Admit Date: 9/10/2020    LOS: 3 days     Subjective: The patient is a 59-year-old male who is seen for evaluation and follow-up of detox. Nurses report he urinated on himself during the night. He has been eating appropriately. We talked with his  about the lack of information that we had received. He has chronic diagnoses that we have also received of schizoaffective disorder cirrhosis of the liver with ascites hepatitis C with a positive hepatitis test yesterday. His alcohol withdrawal has been stable. The patient tells me that he goes across the street and buys his alcohol on a daily basis. He is able to go get food but I have really believed after this visit he is incapable of living alone and should be in a long-term care facility. He has another day before he will complete medical detox and has been relatively stable from that    Objective:     Vitals:    09/12/20 1653 09/12/20 2000 09/13/20 0000 09/13/20 0817   BP: 106/66 122/79 116/79 (!) 141/81   Pulse: 98 90 84 81   Resp: 18 20 20 18   Temp: 98 °F (36.7 °C) 97.9 °F (36.6 °C) 98 °F (36.7 °C) 98 °F (36.7 °C)   SpO2: 97% 97% 96% 98%   Weight:       Height:            Intake and Output:  Current Shift: No intake/output data recorded. Last three shifts: 09/11 1901 - 09/13 0700  In: 980 [P.O.:980]  Out: -     Physical Exam:     His vital signs are as noted they are normal.  Blood pressure 141/81 the pulse is 80 the respirations are 16. Blood pressure 141/81 pulse is 81 respirations 18. His temperature is normal blood pressure 141/81 pulse is 81 respirations 18 temperature normal aVL blood pressure 141/81 pulse 81 respirations are 18 he is pleasant and cooperative. Not significantly confused this morning essentially normal.  Appropriate.   Blood pressure 141/81 pulse 81 respirations 18 pulse ox 98% temperature is normal      Lab/Data Review: The white blood count is increased to 2100 hemoglobin 7.6. The platelet count is 28,447. Sodium is increased to 131    Assessment:   Blood pressure 141/81 the pulse is 81 the respirations are 16 pleasant and cooperative today. He has no edema. The abdomen is slightly full. He seems to be oriented x3. His lab work showed an increase in his white count to 2100 a hemoglobin of 7.6 his sodium is increased to 131,000 and his platelet count is 22,079  The patient does have hepatitis C we now know he now has schizoaffective disorder. Active Problems:    Alcohol withdrawal delirium, acute, mixed level of activity ([facility] [address] (9/11/2020)        Plan: We have talked with his . I personally do not believe he can live alone. We are going to plan on a discharge probably in several more days. His detox is stable his lab work is not excellent but at least is stable he is not transfuse able at this time.   Signed By: Karla Jaeger MD     September 13, 2020

## 2020-09-13 NOTE — GROUP NOTE
[facility] GROUP DOCUMENTATION INDIVIDUAL Group Therapy Note Date: 9/13/2020 Group Start Time: 8612 Group End Time: 0012 Group Topic: AA/NA 
 
SHF 3 DETOX Rey Dawn [facility] GROUP DOCUMENTATION GROUP Group Therapy Note: 
Selfishness Cowardice Dishonesty Fearfulness Antonito dependent Procrastinating Judgmental 
Lying Cheating Intolerant Self-pitying Greedy Antolin [place] Sarcastic Hypercritical 
Gossip Wallie Hedge Physically abusive Sexually abusive 
ungrateful 
bitter Attendees: 2 Attendance: Attended Patient's Goal:   Put a checkmark beside any of the following traits that you despise in others. Also check whether you see these traits in yourself. Interventions/techniques: Informed Follows Directions: Followed directions Interactions: Interacted appropriately Mental Status: Calm and Happy Behavior/appearance: Attentive and Cooperative Goals Achieved: Able to engage in interactions, Able to listen to others and Able to give feedback to another Additional Notes:   
 
Misty Mcintosh

## 2020-09-13 NOTE — PROGRESS NOTES
Received care of patient. Currently lying in bed, watching TV. Patient did not voiced any complaints at this time. Will continue to monitor.

## 2020-09-13 NOTE — ROUTINE PROCESS
Verbal shift change report given to Stefani Victoria RN (oncoming nurse) by Herman Bhakta RN (offgoing nurse). Report included the following information Kardex, MAR and Recent Results.

## 2020-09-13 NOTE — GROUP NOTE
[facility]  GROUP DOCUMENTATION INDIVIDUAL Group Therapy Note Date: 9/13/2020 Group Start Time: 0500 Group End Time: 0530 Group Topic: Education Group - Inpatient SHF 3 DETOX Morena Wernerper [place]  GROUP DOCUMENTATION GROUP Group Therapy Note: 
6AM sleep 7AM sleep 8AM sleep 9AM get up 10AM watch TV 
11AM each lunch 12 AA meeting 1PM watch TV 
2PM watch tv 
3PM watch tv 
4PM watch the news 5PM eat dinner 6PM watch tv 
7PM watch tv Attendees: 2 Attendance: Attended Patient's Goal:  Write in daily schedule Interventions/techniques: Informed Follows Directions: Followed directions Interactions: Interacted appropriately Mental Status: Calm and Happy Behavior/appearance: Attentive and Cooperative Goals Achieved: Able to engage in interactions, Able to listen to others and Able to give feedback to another Additional Notes:   
 
Reynaldo David

## 2020-09-13 NOTE — PROGRESS NOTES
Patient currently sleeping, easily aroused. Respirations are unlabored and regular. Denies pain. Will continue to monitor.

## 2020-09-13 NOTE — PROGRESS NOTES
Spend most of today in bed, voices no complaints. Skin remains moist and flushed. No distress noted.

## 2020-09-13 NOTE — GROUP NOTE
[facility]  GROUP DOCUMENTATION INDIVIDUAL Group Therapy Note Date: 9/13/2020 Group Start Time: 1000 Group End Time: 1414 Group Topic: Education Group - Inpatient SHF 3 DETOX Meghan Mcneal [facility] GROUP DOCUMENTATION GROUP Group Therapy Note: When I am Tempted to Use When I am having withdrawals When I want to have just one drink When I want to see if I can handle using in moderation When I have a headache When I am worrying about something When I have a dream about using drug When I am tired When I'm in pain When I'm depressed When I'm angry When I want to relax When I'm st a party When I see drugs and alcohol on TV When I'm happy When my friends are using When I am on vacation When I am bored Attendees: 2 Attendance: Attended Patient's Goal:   Check the appropriate answer Interventions/techniques: Provide feedback Follows Directions: Followed directions Interactions: Interacted appropriately Mental Status: Calm and Happy Behavior/appearance: Attentive and Cooperative Goals Achieved: Able to listen to others and Able to give feedback to another Additional Notes:   
 
Jose Paredes

## 2020-09-13 NOTE — GROUP NOTE
[facility]  GROUP DOCUMENTATION INDIVIDUAL Group Therapy Note Date: 9/13/2020 Group Start Time: 4509 Group End Time: 2671 Group Topic: Education Group - Inpatient SHF 3 DETOX Cori Costello [facility] GROUP DOCUMENTATION GROUP Group Therapy Note: 
People with addictive personalities often have an ineffective communication style. Take a minute ans answer the following questions. 1. List the times when you find yourself lying other: 
2. List the times you apologize when you do not need to: 3. List the times when you make excuses for other people: 4. List the time when you feel or ashamed for the way you communicate with others: 
5. List the times when you are worried about other people think of you or how they are judging you: 
Attendees:2 Attendance: Attended Patient's Goal: Write the appropriate answer to each question Interventions/techniques: Informed Follows Directions: Followed directions Interactions: Interacted appropriately Mental Status: Calm and Happy Behavior/appearance: Attentive and Cooperative Goals Achieved: Able to engage in interactions, Able to listen to others and Able to give feedback to another Additional Notes:   
 
Osiel Matthews

## 2020-09-13 NOTE — BH NOTES
Chemical Dependency/Substance Abuse Therapist/Counselor Documentation    /THERAPIST PROGRESS NOTE    CURRENT STATUS OF PATIENT: Orientation (check one) [x] Person [x] Place [x] Time [x] Purpose    (Patient voiced concerns: None at this time )    Attitude/Behavior toward Examiner:   [x] Appropriate [x] Cooperative [] Aggressive [] Argumentative [] Angry [] Apathetic [] Passive  [] Childlike [] Interactive [] Guarded [] Demanding [] Dramatic [] Evasive [] Hostile [] Irritable [] Manipulative   [] Uncooperative [] Difficult to redirect [] Isolative-Withdrawal [] Sad-Tearful [] Suspicious [] Defensive    TREATMENT PLAN PROBLEM BEING ADDRESS: relapse prevention    MODALITY:  [x] Individual Therapy  [x] Group Therapy  [] Family Therapy with Client Present  [] Family Therapy without Client Present  [] Multi-Family Group Therapy  [x] Spirituality Psychotherapy Group  [x] Psycho-Edutherapy Group    RISK OF HARM:    [x] None identified    [] Self injurious behavior  [] Threatening others without a plan   [] Actively Suicidal with plan  [] Threatening others with plan  [] Suicidal Thoughts without plan [] Weapon in the home  [] Means to complete a plan    Comments:     General Appearance: [] Normal [] Disheveled [x] Emaciated [] Obese [] Poor Hygiene    Dress: [x] Appropriate [] Eccentric [] Seductive [] Bizarre    Mood: [] Euthymic/normal [] Dysphoric/unease [] Anxious [] Agitated [] Dysthymic [] Depressed [] Euphoric [x] Pleasant  [] Bright [] Angry [] Manic    Affect: [x] Appropriate [] Inappropriate [] Labile [] Constricted [] Blunted [] Flat [] Lethargic [] Preoccupied    THOUGHT CONTENT:   [x] Remains focused on topic/thought control [] Unable to remain focused on topic [] Blocking [] Disorganized  [] Confused [] Preoccupied [] Flight of ideas [] Tangential [] Delusional thought content [] Persecutory  [] Bizarre [] Grandeur [] Guilt [] Somatic     PERCEPTION: [x] Normal [] Hallucinations [] Auditory [] Visual [] Tactile [] Olfactory/smell [] Gustatory/taste    Briefly summarize the specifics of the identified symptoms and/or behaviors listed and progress towards improvement and/or group interactions: participated in community and spirituality group. Individual session focused on discharge planning.     MET (Motivational Enhancement Therapy): Based on discussions and interactions with the patient the patient falls within the following stage of treatment:   [] Pre-contemplation: Denial of illness with no need to change  [x] Contemplation: Awareness of problem and considering change  [] Determination: Willing to change and open to possible change  [] Action: Actively involved in recovery/treatment demonstrates a willing to make plans to change  [] Relapse Prevention: Able to verbalize plans to prevent relapse, 7 day plan    Briefly summarize the patient's interactions/discussions indicating the current or change in level of therapeutic treatment: Patient cannot to 30-45 day treatment due to medical issues     TREATMENT PROVIDED:  [] Developed/Reviewed Crisis Prevention Plan  [] Discussed/Reviewed Treatment Goals on Treatment Plan  [] Discussed/Reviewed Discharge Planning   [] Discussed/Reviewed the patient goals of treatment  [] Refused/Unable to participate in discharge planning [x] Other: patient will be iformed that he will be give information about the CSB/ AAmeeting  RECOMMENDATIONS: [] Change current therapeutic focus  [] Change treatment goals/objectives on the treatment plan    CONTINUED PLAN OF CARE:    DISCHARGE PLANNING:   [] Has identified a family support system   [x] Patient has not identified a family support system  [] Has connected with sober/clean support system  [x] Patient has not connected with sober/clean support system  [] Patient uses special equipment at home and equipment is in the home  [] Has identified community support    [] Patient has not been able to identify community support  Barriers to Discharge:  [] Difficulty returning to present living arrangement  [x] Will require assistance with placement post discharge  [] There is no local substance abuse disorder programs available to the patient  [] Needs referral to    Discharge Planning Comments:Patient will discharge approx Tuesday 9/15/2020 picked up by medicaid cab taken to his home.

## 2020-09-13 NOTE — ROUTINE PROCESS
Verbal shift change report given to Iain Jacobs RN (oncoming nurse) by Renuka Blandon RN (offgoing nurse). Report included the following information Kardex, MAR and Recent Results.

## 2020-09-14 PROBLEM — D61.818 PANCYTOPENIA (HCC): Chronic | Status: ACTIVE | Noted: 2020-09-14

## 2020-09-14 PROBLEM — F10.930 ALCOHOL WITHDRAWAL SYNDROME WITHOUT COMPLICATION (HCC): Status: ACTIVE | Noted: 2020-09-11

## 2020-09-14 PROCEDURE — 97161 PT EVAL LOW COMPLEX 20 MIN: CPT

## 2020-09-14 PROCEDURE — 65270000029 HC RM PRIVATE

## 2020-09-14 PROCEDURE — 74011250637 HC RX REV CODE- 250/637: Performed by: FAMILY MEDICINE

## 2020-09-14 PROCEDURE — 99231 SBSQ HOSP IP/OBS SF/LOW 25: CPT | Performed by: INTERNAL MEDICINE

## 2020-09-14 RX ADMIN — TRAZODONE HYDROCHLORIDE 100 MG: 50 TABLET ORAL at 21:47

## 2020-09-14 RX ADMIN — FERROUS SULFATE TAB 325 MG (65 MG ELEMENTAL FE) 325 MG: 325 (65 FE) TAB at 16:17

## 2020-09-14 RX ADMIN — FLUOXETINE 40 MG: 20 CAPSULE ORAL at 09:53

## 2020-09-14 RX ADMIN — SPIRONOLACTONE 100 MG: 25 TABLET ORAL at 09:53

## 2020-09-14 RX ADMIN — FOLIC ACID 1 MG: 1 TABLET ORAL at 09:54

## 2020-09-14 RX ADMIN — POTASSIUM CHLORIDE 20 MEQ: 750 TABLET, EXTENDED RELEASE ORAL at 09:53

## 2020-09-14 RX ADMIN — LORAZEPAM 1 MG: 1 TABLET ORAL at 21:47

## 2020-09-14 RX ADMIN — FERROUS SULFATE TAB 325 MG (65 MG ELEMENTAL FE) 325 MG: 325 (65 FE) TAB at 13:35

## 2020-09-14 RX ADMIN — Medication 100 MG: at 09:53

## 2020-09-14 RX ADMIN — MULTIVITAMIN TABLET 1 TABLET: TABLET at 09:53

## 2020-09-14 RX ADMIN — FERROUS SULFATE TAB 325 MG (65 MG ELEMENTAL FE) 325 MG: 325 (65 FE) TAB at 09:59

## 2020-09-14 NOTE — ROUTINE PROCESS
Verbal shift change report given to GHAZALA Sykes RN  (oncoming nurse) by Roberto Ochoa  (offgoing nurse). Report included the following information MAR, Recent Results and Med Rec Status. Dr. Kilpatrick Spikes in complete multi team meeting. Change in fall status. , patient was not able to transfer to chair without assistance. Morning care completed to include a partial bath. Plan is to transfer back to the care of his local Community service board for continued care and services.

## 2020-09-14 NOTE — PROGRESS NOTES
Problem: Mobility Impaired (Adult and Pediatric)  Goal: *Acute Goals and Plan of Care (Insert Text)  Description: Pt is (I) at this time and does not require further P.T. He was instructed to continue ambulation while using the railway in the magana. Outcome: Resolved/Met   PHYSICAL THERAPY EVALUATION    Patient: Dre Cody (56 y.o. male)  Date: 9/14/2020  Primary Diagnosis: Alcohol withdrawal delirium, acute, mixed level of [facility] [F10.231]        Precautions:  Fall    ASSESSMENT :  Based on the objective data described below, the patient presents with difficulty in walking. Patient will benefit from skilled intervention to address the above impairments. Patients rehabilitation potential is considered to be Excellent  Factors which may influence rehabilitation potential include:   [x]         None noted  []         Mental ability/status  []         Medical condition  []         Home/family situation and support systems  []         Safety awareness  []         Pain tolerance/management  []         Other:      PLAN :  Recommendations and Planned Interventions:  []           Bed Mobility Training             []    Neuromuscular Re-Education  []           Transfer Training                   []    Orthotic/Prosthetic Training  []           Gait Training                          []    Modalities  []           Therapeutic Exercises          []    Edema Management/Control  []           Therapeutic Activities            []    Patient and Family Training/Education  []           Other (comment):    Frequency/Duration: D/c from P.T. at this time  Discharge Recommendations: Home independently  Further Equipment Recommendations for Discharge: N/A     SUBJECTIVE:   Patient does not have any complaints.     OBJECTIVE DATA SUMMARY:     Past Medical History:   Diagnosis Date    Arthritis     Coagulation disorder ([facility]     platelets low    Liver disease     Hep C    Psychiatric disorder     bipolor    Sleep apnea    No past surgical history on file. Barriers to Learning/Limitations: None  Compensate with: visual, verbal, tactile, kinesthetic cues/model  Prior Level of Function/Home Situation: Pt was a community ambulator who did not use an AD and who was (I) with ADLs. Home Situation  Home Environment: Private residence  # Steps to Enter: 12  Rails to Enter: Yes  Hand Rails : Bilateral  One/Two Story Residence: One story  Living Alone: Yes  Support Systems: /(MICHELLE Rowell)  Patient Expects to be Discharged to[de-identified] Private residence  Current DME Used/Available at Home: None  Critical Behavior:  Neurologic State: Alert  Orientation Level: Oriented X4  Cognition: Appropriate decision making                       Strength:    Strength: Within functional limits(L shoulder and B elbows weak)                    Tone & Sensation:                  Sensation: Intact               Range Of Motion:  AROM: Within functional limits                       Functional Mobility:  Bed Mobility:  Rolling: Modified independent  Supine to Sit: Modified independent  Sit to Supine: Modified independent  Scooting: Modified independent  Transfers:  Sit to Stand: Modified independent  Stand to Sit: Modified independent  Stand Pivot Transfers: Modified independent     Bed to Chair: Modified independent       Balance:   Sitting: Intact  Standing: Impaired  Standing - Static: Good  Standing - Dynamic : Fair  Ambulation/Gait Training:  Distance (ft): 140 Feet (ft)  Assistive Device: Other (comment)(Handrail)  Ambulation - Level of Assistance: Stand-by assistance     Gait Description (WDL): Exceptions to WDL  Gait Abnormalities: Path deviations     Pain:  Pain Scale 1: Numeric (0 - 10)  Pain Intensity 1: 0     Activity Tolerance:   Good  Please refer to the flowsheet for vital signs taken during this treatment.   Vital Signs  Temp: 98.1 °F (36.7 °C)     Pulse (Heart Rate): 89     BP: 111/77     Resp Rate: 18     O2 Sat (%): 97 %    After treatment:   []         Patient left in no apparent distress sitting up in chair  [x]         Patient left in no apparent distress in bed  []         Call bell left within reach  [x]         Nursing notified  []         Caregiver present  []         Bed alarm activated    COMMUNICATION/EDUCATION:   [x]         Fall prevention education was provided and the patient/caregiver indicated understanding. []         Patient/family have participated as able in goal setting and plan of care. []         Patient/family agree to work toward stated goals and plan of care. []         Patient understands intent and goals of therapy, but is neutral about his/her participation. []         Patient is unable to participate in goal setting and plan of care.     Thank you for this referral.    Cisco Bardales, PT, DPT     Time Calculation: 16 mins

## 2020-09-14 NOTE — PROGRESS NOTES
Patient currently sleeping, easily aroused. Compliant with bedtime medications. Will continue to monitor.

## 2020-09-14 NOTE — ROUTINE PROCESS
Verbal shift change report given to Evelyn Coe RN (oncoming nurse) by Dolores Restrepo RN (offgoing nurse). Report included the following information Kardex, MAR and Recent Results.

## 2020-09-14 NOTE — GROUP NOTE
[facility] GROUP DOCUMENTATION INDIVIDUAL Group Therapy Note Date: 9/14/2020 Group Start Time: 1694 Group End Time: 1296 Group Topic: Education Group - Inpatient SHF 3 DETOX Lares Ralph [facility] GROUP DOCUMENTATION GROUP Group Therapy Note Group Education: Health and Wellness: Hepatis HIV/AIDS/STD's Attendees: 2 Attendance: Attended Patient's Goal: Marielle Ryan was supported with the development of understanding the factors which contribute to the development of his addiction. Interventions/techniques: Supported Follows Directions: Followed directions Interactions: Interacted appropriately Mental Status: Calm Behavior/appearance: Cooperative, Disheveled, Grooming impaired and Needed prompting Goals Achieved: Able to engage in interactions, Able to give feedback to another, Able to receive feedback, Able to self-disclose, Discussed coping and Identified resources and support systems Additional Notes:  
 
Katerine Watkins

## 2020-09-14 NOTE — ROUTINE PROCESS
Verbal shift change report given to Ella Irving (oncoming nurse) by Chaya Armas RN (offgoing nurse). Report included the following information Kardex, Intake/Output, MAR and Recent Results.

## 2020-09-14 NOTE — GROUP NOTE
FERNANDO  GROUP DOCUMENTATION INDIVIDUAL Group Therapy Note Date: 9/14/2020 Group Start Time: 1000 Group End Time: 2934 Group Topic: Education Group - Inpatient SHF 3 DETOX Sergio Hi [facility] GROUP DOCUMENTATION GROUP Group Therapy Note Definition of Addiction Attendees: 2 Attendance: Attended Patient's Goal: Devan Em will develop the skills necessary to understand the disease model of addiction and verbalize comprehension to the staff. Interventions/techniques: Supported Follows Directions: Followed directions Interactions: Interacted appropriately Mental Status: Calm Behavior/appearance: Cooperative Goals Achieved: Able to listen to others, Able to reflect/comment on own behavior, Able to receive feedback and Discussed discharge plans Additional Notes:  Patient was able to verbalize an understanding of what addiction is and the consequences of continued use. Amanda Donaldson

## 2020-09-14 NOTE — PROGRESS NOTES
There are no diagnoses linked to this encounter. 1. ETOH withdrawal: The patient currently has mild to moderate signs of withdrawal.  We will continue Ativan taper per protocol with plans for him to be discharged tomorrow    2. 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 I have reviewed his CBCs for the past few days as well as old medical records. His baseline platelets seem to range between 28,000 and 50,000. 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 He does have a liver specialist who he follows with. 3.  Schizoaffective disorder stable    HPI   Patient Active Problem List   Diagnosis Code    Schizoaffective disorder, bipolar type ([facility] F25.0    Cirrhosis of liver with ascites ([facility] K74.60, R18.8    Chronic hepatitis C without hepatic coma ([facility] B18.2    Alcohol abuse F10.10    Severe obesity ([facility] E66.01    Alcohol withdrawal delirium, acute, mixed level of activity ([facility] F10.231        No current facility-administered medications on file prior to encounter. Current Outpatient Medications on File Prior to Encounter   Medication Sig Dispense Refill    ferrous sulfate 220 mg (44 mg iron)/5 mL solution Take 325 mg by mouth daily.  thiamine HCL (B-1) 100 mg tablet Take 100 mg by mouth daily.  FLUoxetine (PROzac) 40 mg capsule Take 40 mg by mouth daily.  folic acid (FOLVITE) 1 mg tablet Take 1 mg by mouth daily.  ofloxacin (FLOXIN) 0.3 % otic solution INT 2 GTS IN AD BID FOR 7 DAYS  0    lactulose (CHRONULAC) 10 gram/15 mL solution Take 30 mL by mouth two (2) times a day. Indications: impaired brain function due to liver disease 946 mL 11    spironolactone (ALDACTONE) 100 mg tablet Take 1 Tab by mouth daily. 90 Tab 3    furosemide (LASIX) 20 mg tablet Take 1 Tab by mouth daily. 90 Tab 3    sertraline (ZOLOFT) 100 mg tablet Take  by mouth daily.  risperiDONE (RISPERDAL) 3 mg tablet Take  by mouth.       OXcarbazepine (TRILEPTAL) 300 mg tablet Take  by mouth.  risperiDONE microspheres (RISPERDAL CONSTA) 50 mg/2 mL injection 50 mg by IntraMUSCular route once. ROS  - GEN: no weight gain/loss, no fevers or chills  - HEENT: no vision changes, no tinnitus, no sore throat  - CV: no cp, palpitations or edema  - RESP: no sob, cough  - ABD: no n/v/d, no blood in stool  - : no dysuria or changes in freq. - SKIN: no rashes, ulcers  - Neuro: + resting tremors, parasthesia in extremities, no headaches  - MS: No weakness in extremities, no gait abnormalities  - Psych: negative for depression or anxiety      Visit Vitals  /81 (BP 1 Location: Left arm, BP Patient Position: Supine)   Pulse 88   Temp 97.9 °F (36.6 °C)   Resp 18   Ht 5' 7\" (1.702 m)   Wt 174 lb 15.7 oz (79.4 kg) Comment: 175   SpO2 96%   BMI 27.41 kg/m²           Physical Exam  Constitutional:       Appearance: Dishevelled appearance. overweight. NAD and pleasant  HENT:      Head: Normocephalic. Nose: Nose normal.      Mouth/Throat:      Mouth: Mucous membranes are moist. Throat not inflammed  Eyes:      Extraocular Movements: Extraocular movements intact. Conjunctiva/sclera: Conjunctivae normal. Sclera anicteric        Cardiovascular:      Rate and Rhythm: Normal rate and regular rhythm. Pulses: Normal pulses. Pulmonary:      Effort: No respiratory distress. Breath sounds: CTAB and No stridor. No rhonchi. Abdominal:      General: There is  distension.  NT,   Neurological:      Mental Status: + resting tremor, widely spaced gait     Cranial Nerves: cranial nerves grossly intact  Muskuloskeletal     Unstable on feet  Skin     Dry without lesions on examined areas, warm to the touch       Deferred  Psychiatry     Calm, normal affect, interacting normally

## 2020-09-14 NOTE — PROGRESS NOTES
Received care of patient. A&O to person, place, and time. Ambulates as desired. Pleasant and cooperative. Denies pain. Tremors were not seen but felt. No GI complaints at this time. CIWA is 8. Bed is in lowest position and callbell is within reach. Will continue to monitor.

## 2020-09-14 NOTE — BH NOTES
Chemical Dependency/Substance Abuse Therapist/Counselor Documentation    /THERAPIST PROGRESS NOTE    CURRENT STATUS OF PATIENT: Orientation (check one) [x] Person [] Place [x] Time [x] Purpose    (Patient voiced concerns:  )  Patient voiced wanting to talk to his mother on the phone. Phone was given to him after groups were completed at 1830. Patient also expressed wanting to go to a 30 day program att discharge. Reviewed with him  That he needs to have health issues under control first.  Attitude/Behavior toward Examiner:   [x] Appropriate [x] Cooperative [] Aggressive [] Argumentative [] Angry [] Apathetic [] Passive  [] Childlike [] Interactive [] Guarded [] Demanding [] Dramatic [] Evasive [] Hostile [] Irritable [] Manipulative   [] Uncooperative [] Difficult to redirect [] Isolative-Withdrawal [] Sad-Tearful [] Suspicious [] Defensive    TREATMENT PLAN PROBLEM BEING ADDRESS: Helping patient to develop skills to assist in understanding the factors which contribute to his development of his addiction and how to develop interventions to address triggers, decisions, and behaviors that may lead to relapse.     MODALITY:  [x] Individual Therapy  [x] Group Therapy  [] Family Therapy with Client Present  [] Family Therapy without Client Present  [] Multi-Family Group Therapy  [x] Spirituality Psychotherapy Group  [x] Psycho-Edutherapy Group    RISK OF HARM:    [x] None identified    [] Self injurious behavior  [] Threatening others without a plan   [] Actively Suicidal with plan  [] Threatening others with plan  [] Suicidal Thoughts without plan [] Weapon in the home  [] Means to complete a plan    Comments:     General Appearance: [] Normal [x] Disheveled [] Emaciated [] Obese [] Poor Hygiene    Dress: [x] Appropriate [] Eccentric [] Seductive [] Bizarre    Mood: [x] Euthymic/normal [] Dysphoric/unease [] Anxious [] Agitated [] Dysthymic [] Depressed [] Euphoric [x] Pleasant  [] Bright [] Angry [] Manic    Affect: [x] Appropriate [] Inappropriate [] Labile [] Constricted [] Blunted [] Flat [] Lethargic [] Preoccupied    THOUGHT CONTENT:   [x] Remains focused on topic/thought control [] Unable to remain focused on topic [] Blocking [] Disorganized  [] Confused [] Preoccupied [] Flight of ideas [] Tangential [] Delusional thought content [] Persecutory  [] Bizarre [] Grandeur [] Guilt [] Somatic     PERCEPTION: [x] Normal [] Hallucinations [] Auditory [] Visual [] Tactile [] Olfactory/smell [] Gustatory/taste    Briefly summarize the specifics of the identified symptoms and/or behaviors listed and progress towards improvement and/or group interactions: Patient was able to attend group and participate. He continues to have difficulty hearing and difficulty with his balance. He is using a walker now. Physical therapy had a session with him on how to properly use the walker. MET (Motivational Enhancement Therapy): Based on discussions and interactions with the patient the patient falls within the following stage of treatment:   [] Pre-contemplation: Denial of illness with no need to change  [x] Contemplation: Awareness of problem and considering change  [] Determination: Willing to change and open to possible change  [] Action: Actively involved in recovery/treatment demonstrates a willing to make plans to change  [] Relapse Prevention: Able to verbalize plans to prevent relapse, 7 day plan    Briefly summarize the patient's interactions/discussions indicating the current or change in level of therapeutic treatment: Patient is expressing that he would like further treatment. He realizes he needs more than detox provides to help him in remaining without relapse.     TREATMENT PROVIDED:  [] Developed/Reviewed Crisis Prevention Plan  [x] Discussed/Reviewed Treatment Goals on Treatment Plan  [x] Discussed/Reviewed Discharge Planning   [] Discussed/Reviewed the patient goals of treatment  [] Refused/Unable to participate in discharge planning [] Other:     RECOMMENDATIONS: [] Change current therapeutic focus  [] Change treatment goals/objectives on the treatment plan    CONTINUED PLAN OF CARE:   Medical detox and relapse prevention  DISCHARGE PLANNING:   [x] Has identified a family support system   [] Patient has not identified a family support system  [] Has connected with sober/clean support system  [] Patient has not connected with sober/clean support system  [] Patient uses special equipment at home and equipment is in the home  [x] Has identified community support    [] Patient has not been able to identify community support  Barriers to Discharge:  [x] Difficulty returning to present living arrangement  [] Will require assistance with placement post discharge  [] There is no local substance abuse disorder programs available to the patient  [] Needs referral to     Discharge Planning Comments:   Patient has medical and mental issues that need to be addressed after discharge. [facility] They provide wrap around services for him. He will be linked back with the PACT Team Services upon his discharge.   Patient is not currently physically able to be referred to a 30 day program.

## 2020-09-14 NOTE — GROUP NOTE
IP  GROUP DOCUMENTATION INDIVIDUAL Group Therapy Note Date: 9/14/2020 Group Start Time: 7268 Group End Time: 1021 Group Topic: Education Group - Inpatient SHF 3 DETOX Raymona Knee [facility] GROUP DOCUMENTATION GROUP Group Therapy Note Attendees: Baylee Nobles Attendance: Attended Patient's Goal:  To gain knowledge of AA/NA 12 Step Program 
 
Interventions/techniques: Informed and Supported Follows Directions: Followed directions Interactions: Interacted appropriately Mental Status: Calm and Flat Behavior/appearance: Cooperative Goals Achieved: Able to engage in interactions, Able to receive feedback, Discussed coping and Discussed discharge plans Additional Notes:  Client expressed wanting to attend 30 day Program and previous history of attending AA over 30 years ago. Sharla Gonsalves

## 2020-09-14 NOTE — GROUP NOTE
[facility] GROUP DOCUMENTATION INDIVIDUAL Group Therapy Note Date: 9/14/2020 Group Start Time: 2494 Group End Time: 6297 Group Topic: Education Group - Inpatient SHF 3 DETOX Evonbassem Gina [facility] GROUP DOCUMENTATION GROUP Group Therapy Note Attendees: Prabha Chance Attendance: Attended Patient's Goal:  
Grounding Techniques to help with triggers/thoughts of using. Interventions/techniques: Informed and Supported Follows Directions: Followed directions Interactions: Interacted appropriately Mental Status: Calm and Flat Behavior/appearance: Attentive and Cooperative Goals Achieved: Discussed coping, Identified triggers and Identified relapse prevention strategies Additional Notes:  Grounding Techniques reviewed to help with triggers of using. Reviewed The Chain of Relapse. Estela Grubbs

## 2020-09-15 VITALS
RESPIRATION RATE: 18 BRPM | HEIGHT: 67 IN | DIASTOLIC BLOOD PRESSURE: 76 MMHG | SYSTOLIC BLOOD PRESSURE: 102 MMHG | HEART RATE: 77 BPM | TEMPERATURE: 97.6 F | BODY MASS INDEX: 27.46 KG/M2 | OXYGEN SATURATION: 96 % | WEIGHT: 174.98 LBS

## 2020-09-15 PROCEDURE — 99239 HOSP IP/OBS DSCHRG MGMT >30: CPT | Performed by: INTERNAL MEDICINE

## 2020-09-15 NOTE — DISCHARGE SUMMARY
9/10/2020   Date of Discharge: 9/15/2020    Active Hospital Problems    Diagnosis Date Noted    Alcohol withdrawal syndrome without complication ([facility] 34/95/9294        Current Discharge Medication List      CONTINUE these medications which have NOT CHANGED    Details   ferrous sulfate 220 mg (44 mg iron)/5 mL solution Take 325 mg by mouth daily. thiamine HCL (B-1) 100 mg tablet Take 100 mg by mouth daily. FLUoxetine (PROzac) 40 mg capsule Take 40 mg by mouth daily. folic acid (FOLVITE) 1 mg tablet Take 1 mg by mouth daily. ofloxacin (FLOXIN) 0.3 % otic solution INT 2 GTS IN AD BID FOR 7 DAYS  Refills: 0      lactulose (CHRONULAC) 10 gram/15 mL solution Take 30 mL by mouth two (2) times a day. Indications: impaired brain function due to liver disease  Qty: 946 mL, Refills: 11      spironolactone (ALDACTONE) 100 mg tablet Take 1 Tab by mouth daily. Qty: 90 Tab, Refills: 3      furosemide (LASIX) 20 mg tablet Take 1 Tab by mouth daily. Qty: 90 Tab, Refills: 3      sertraline (ZOLOFT) 100 mg tablet Take  by mouth daily. risperiDONE (RISPERDAL) 3 mg tablet Take  by mouth. OXcarbazepine (TRILEPTAL) 300 mg tablet Take  by mouth. risperiDONE microspheres (RISPERDAL CONSTA) 50 mg/2 mL injection 50 mg by IntraMUSCular route once. Results for Karina Hinojosa (MRN [id number]) as of 9/15/2020 07:19   Ref.  Range 9/12/2020 09:45 9/12/2020 09:45 9/13/2020 04:25   WBC Latest Ref Range: 4.6 - 13.2 K/uL 1.9 (L)  2.1 (L)   RBC Latest Ref Range: 4.70 - 5.50 M/uL 2.04 (L)  1.93 (L)   HGB Latest Ref Range: 13.0 - 16.0 % 8.0 (L)  7.6 (L)   HCT Latest Ref Range: 36.0 - 48.0 % 23.7 (L)  22.5 (L)   MCV Latest Ref Range: 74.0 - 97.0 .2 (H)  116.6 (H)   MCH Latest Ref Range: 24.0 - 34.0 PG 39.2 (H)  39.4 (H)   MCHC Latest Ref Range: 31.0 - 37.0 g/dL 33.8  33.8   RDW Latest Units: % 14.0  14.2   PLATELET Latest Ref Range: 135 - 420 K/uL 33 (L)  37 (L)   MPV Latest Units: FL 10.1 9. 9   NEUTROPHILS Latest Ref Range: 40 - 73 % 57  41   BAND NEUTROPHILS Latest Units: % 1  2   LYMPHOCYTES Latest Ref Range: 21 - 52 % 31  56 (H)   MONOCYTES Latest Ref Range: 3 - 10 % 10  1 (L)   EOSINOPHILS Latest Ref Range: 0 - 5 % 0  0   BASOPHILS Latest Ref Range: 0 - 2 % 1  0   IMMATURE GRANULOCYTES Latest Units: % 0  0   ABS. NEUTROPHILS Latest Ref Range: 1.8 - 8.0 K/UL 1.1  0.9 (L)   ABS. IMM. GRANS. Latest Units: K/UL 0.0  0.0   ABS. LYMPHOCYTES Latest Ref Range: 0.9 - 3.6 K/UL 0.6  1.2   ABS. MONOCYTES Latest Ref Range: 0.05 - 1.2 K/UL 0.2  0.0 (L)   ABS. EOSINOPHILS Latest Ref Range: 0.0 - 0.4 K/UL 0.0  0.0   ABS. BASOPHILS Latest Ref Range: 0.0 - 0.1 K/UL 0.0  0.0   RBC COMMENTS Latest Units:   Rouleaux. .. Macrocytosis. .. Anisocytosis. .. Sodium Latest Ref Range: 135 - 145 mmol/L 131 (L)     Potassium Latest Ref Range: 3.2 - 5.1 mmol/L 3.7     Chloride Latest Ref Range: 94 - 111 mmol/L 102     CO2 Latest Ref Range: 21 - 33 mmol/L 24     Anion gap Latest Units: mmol/L 5     Glucose Latest Ref Range: 70 - 110 mg/dL 108     BUN Latest Ref Range: 9 - 21 mg/dL 12     Creatinine Latest Ref Range: 0.8 - 1.50 mg/dL 1.20     BUN/Creatinine ratio Latest Units:   10     Calcium Latest Ref Range: 8.5 - 10.5 mg/dL 7.9 (L)     GFR est non-AA Latest Units: ml/min/1.73m2 >60     GFR est AA Latest Units: ml/min/1.73m2 >60        DIET REGULAR         CONDITION: Stable    CIWA scores: Prior to discharge.   Nausea and Vomiting   0   0   0        Tactile Disturbances   0   0   1        Tremor   1   1   1        Auditory Disturbances   0   0   2        Paroxysmal Sweats   1   1   2        Visual Disturbances   2   2   2        Anxiety   2   2   2        Headache, Fullness in Head   0   0   0        Agitation   1   1   2        Orientation and Clouding of Sensorium   0   0   2        CIWA-Ar Total   7   7   14          Follow-up Information     Follow up With Specialties Details Why Contact Info    [facility] [place] [facility]  Call on 9/15/2020 Follow up for DALI and mental health skill building [address] [facility]  [address], [address]  Follow up with the Program for Assertive Community Treatment (PACT)  Case Joyce Spence  [phone]    Gildardo Winters NP Nurse Practitioner   [address] [address] [address] Mera  [phone]          HOSPITAL COURSE  This is a pleasant 81-year-old gentleman with a longstanding history of end-stage liver disease and pancytopenia due to chronic alcohol use and hepatitis C who presented in alcohol withdrawal.  On presentation his CIWA score was greater than 10. He was admitted into the detox program for alcohol detoxification. On admission it became very clear that he had significant electrolyte and bone marrow abnormalities due to his chronic alcohol use. His CBC was rechecked and confirmed that it was stable. The admitting provider reviewed previous labs available from hematology which confirmed that his current white cell count and platelets are essentially at his baseline. He showed no signs of bleeding during this admission. He tolerated detoxification well but it was noted that he was somewhat debilitated and there was a brief consideration given to possibly placing him. The patient at this time had no interest in doing that and is being discharged home in able condition.   CIWA score on discharge was less than 10    Visit Vitals  /69 (BP 1 Location: Left arm, BP Patient Position: Supine)   Pulse 79   Temp 97.6 °F (36.4 °C)   Resp 18   Ht 5' 7\" (1.702 m)   Wt 174 lb 15.7 oz (79.4 kg) Comment: 175   SpO2 96%   BMI 27.41 kg/m²     Gen: NAD  Heent; mmm  Cv: RRR  Resp; CTAB  Abd: Slightly distended, NT     Total Time Spent: Greater than 30 minutes

## 2020-09-15 NOTE — ROUTINE PROCESS
Verbal shift change report given to Dinesh Silva RN (oncoming nurse) by Harjit Chauhan RN (offgoing nurse). Report included the following information Kardex, Intake/Output and MAR.

## 2020-09-15 NOTE — DISCHARGE INSTRUCTIONS
Patient Education        Acute Alcohol Intoxication: Care Instructions  Your Care Instructions     You have had treatment to help your body rid itself of alcohol. Too much alcohol upsets the body's fluid balance. Your doctor may have given you fluids and vitamins. For some people, drinking too much alcohol is a one-time event. For others, it is an ongoing problem. In either case, it is serious. It can be life-threatening. Follow-up care is a key part of your treatment and safety. Be sure to make and go to all appointments, and call your doctor if you are having problems. It's also a good idea to know your test results and keep a list of the medicines you take. How can you care for yourself at home? · Do not drink and drive. · Be safe with medicines. Take your medicines exactly as prescribed. Call your doctor if you think you are having a problem with your medicine. · Your doctor may have prescribed disulfiram (Antabuse). Do not drink any alcohol while you are taking this medicine. You may have severe or even life-threatening side effects from even small amounts of alcohol. · If you were given medicine to prevent nausea, be sure to take it exactly as prescribed. · Before you take any medicine, tell your doctor if:  ? You have had a bad reaction to any medicines in the past.  ? You are taking other medicines, including over-the-counter ones, or have other health problems. ? You are or could be pregnant. · Be prepared to have some symptoms of withdrawal in the next few days. · Drink plenty of liquids in the next few days. · Seek help if you need it to stop drinking. Getting counseling and joining a support group can help you stay sober. Try a support group such as Alcoholics Anonymous. · Avoid alcohol when you take medicines. It can react with many medicines and cause serious problems. When should you call for help? Call 911 anytime you think you may need emergency care.  For example, call if:    · You feel confused and are seeing things that are not there.     · You are thinking about killing yourself or hurting others.     · You have a seizure.     · You vomit blood or what looks like coffee grounds. Call your doctor now or seek immediate medical care if:    · You have trembling, restlessness, sweating, and other withdrawal symptoms that are new or that get worse.     · Your withdrawal symptoms come back after not bothering you for days or weeks.     · You can't stop vomiting. Watch closely for changes in your health, and be sure to contact your doctor if:    · You need help to stop drinking. Where can you learn more? Go to http://galen-moni.info/  Enter T102 in the search box to learn more about \"Acute Alcohol Intoxication: Care Instructions. \"  Current as of: June 29, 2020               Content Version: 12.6  © 4451-3000 [employer]. Care instructions adapted under license by [employer] (which disclaims liability or warranty for this information). If you have questions about a medical condition or this instruction, always ask your healthcare professional. [address] any warranty or liability for your use of this information.

## 2020-09-15 NOTE — ROUTINE PROCESS
Verbal shift change report given to GHAZALA Sykes RN (oncoming nurse) by RASHIDA Mcmullen RN (offgoing nurse). Report included the following information Intake/Output, MAR and Quality Measures. Dr Alexander Milling in to complete discharge orders and assessment. Patient to dischage this morning. Al belongings were packed and he verbalized understanding.

## 2020-09-15 NOTE — ROUTINE PROCESS
Harmeetkurt Coyle received written and oral discharge instructions and he verbalized understanding. He was given 0 prescriptions with instructions on use and education. Shmuelcamilo Leonides is to follow-up with his PCP to discuss  
alcohol withdrawals. He was given literature on preventing blood clots and on smoking cessation. No complaints voiced. All belongings were returned to Vinny Coyle.

## 2021-08-12 NOTE — TELEPHONE ENCOUNTER
Lasix and Spironolactone SSKI Counseling:  I discussed with the patient the risks of SSKI including but not limited to thyroid abnormalities, metallic taste, GI upset, fever, headache, acne, arthralgias, paraesthesias, lymphadenopathy, easy bleeding, arrhythmias, and allergic reaction.

## 2023-09-13 NOTE — PROGRESS NOTES
Received patient for care alert and oriented x 4, skin moist and flushed. Tremor can be felt fingertip to fingertip. Denies complaint of GI upset. No complaint pain. Noted anxiety and restlessness. Pleasant and polite. Report of sleeping good at night. No distress noted. CIWA score 9. Anesthesia Volume In Cc: 6

## (undated) DEVICE — MAJ-1414 SINGLE USE ADPATER BIOPSY VALV: Brand: SINGLE USE ADAPTOR BIOPSY VALVE

## (undated) DEVICE — KENDALL RADIOLUCENT FOAM MONITORING ELECTRODE RECTANGULAR SHAPE: Brand: KENDALL

## (undated) DEVICE — SPONGE GZ W4XL4IN COT 12 PLY TYP VII WVN C FLD DSGN

## (undated) DEVICE — ENDO CARRY-ON PROCEDURE KIT INCLUDES ENZYMATIC SPONGE, GAUZE, BIOHAZARD LABEL, TRAY, LUBRICANT, DIRTY SCOPE LABEL, WATER LABEL, TRAY, DRAWSTRING PAD, AND DEFENDO 4-PIECE KIT.: Brand: ENDO CARRY-ON PROCEDURE KIT

## (undated) DEVICE — MEDI-VAC NON-CONDUCTIVE SUCTION TUBING: Brand: CARDINAL HEALTH

## (undated) DEVICE — MOUTHPIECE ENDOSCP 20X27MM --

## (undated) DEVICE — TRAP SPEC COLL POLYP POLYSTYR --

## (undated) DEVICE — SINGLE PORT MANIFOLD: Brand: NEPTUNE 2